# Patient Record
Sex: MALE | Race: WHITE | NOT HISPANIC OR LATINO | Employment: OTHER | URBAN - NONMETROPOLITAN AREA
[De-identification: names, ages, dates, MRNs, and addresses within clinical notes are randomized per-mention and may not be internally consistent; named-entity substitution may affect disease eponyms.]

---

## 2017-04-11 ENCOUNTER — COMMUNICATION - GICH (OUTPATIENT)
Dept: FAMILY MEDICINE | Facility: OTHER | Age: 37
End: 2017-04-11

## 2017-04-11 ENCOUNTER — HOSPITAL ENCOUNTER (OUTPATIENT)
Dept: RADIOLOGY | Facility: OTHER | Age: 37
End: 2017-04-11
Attending: NURSE PRACTITIONER

## 2017-04-11 ENCOUNTER — HISTORY (OUTPATIENT)
Dept: FAMILY MEDICINE | Facility: OTHER | Age: 37
End: 2017-04-11

## 2017-04-11 ENCOUNTER — OFFICE VISIT - GICH (OUTPATIENT)
Dept: FAMILY MEDICINE | Facility: OTHER | Age: 37
End: 2017-04-11

## 2017-04-11 DIAGNOSIS — J22 ACUTE LOWER RESPIRATORY INFECTION: ICD-10-CM

## 2017-04-11 DIAGNOSIS — J06.9 ACUTE UPPER RESPIRATORY INFECTION: ICD-10-CM

## 2018-01-04 NOTE — TELEPHONE ENCOUNTER
"Patient Information     Patient Name MRN Greg Burris [id number] Male 1980      Telephone Encounter by Marie Casas RN at 2017 10:15 AM     Author:  Marie Casas RN Service:  (none) Author Type:  NURS- Registered Nurse     Filed:  2017 10:33 AM Encounter Date:  2017 Status:  Signed     :  Marie Casas RN (NURS- Registered Nurse)            Patient reports \"chest cold\" and shooting pain lasting 4-5 seconds in right chest. Per triage protocol, advised patient to be seen within 3 days. Transferred to scheduling.    Reason for Disposition    [1] Chest pain(s) lasting a few seconds AND [2] persists > 3 days    Answer Assessment - Initial Assessment Questions  1. LOCATION: \"Where does it hurt?\"        Right side, near pectoral muscle  2. RADIATION: \"Does the pain go anywhere else?\" (e.g., into neck, jaw, arms, back)      no  3. ONSET: \"When did the chest pain begin?\" (Minutes, hours or days)       2-3 days ago  4. PATTERN \"Does the pain come and go, or has it been constant since it started?\"  \"Does it get worse with exertion?\"       Comes and goes, not worse with exertion  5. DURATION: \"How long does it last\" (e.g., seconds, minutes, hours)      4-5 seconds  6. SEVERITY: \"How bad is the pain?\"  (e.g., Scale 1-10; mild, moderate, or severe)     - MILD (1-3): doesn't interfere with normal activities      - MODERATE (4-7): interferes with normal activities or awakens from sleep     - SEVERE (8-10): excruciating pain, unable to do any normal activities        4-5/10  7. CARDIAC RISK FACTORS: \"Do you have any history of heart problems or risk factors for heart disease?\" (e.g., prior heart attack, angina; high blood pressure, diabetes, being overweight, high cholesterol, smoking, or strong family history of heart disease)      overweight  8. PULMONARY RISK FACTORS: \"Do you have any history of lung disease?\"  (e.g., blood clots in lung, asthma, emphysema, birth " "control pills)      Lung collapsed at birth, has had cough for several weeks  9. CAUSE: \"What do you think is causing the chest pain?\"      unknown  10. OTHER SYMPTOMS: \"Do you have any other symptoms?\" (e.g., dizziness, nausea, vomiting, sweating, fever, difficulty breathing, cough)      cough  11. PREGNANCY: \"Is there any chance you are pregnant?\" \"When was your last menstrual period?\"      N/a male    Protocols used: ADULT CHEST PAIN-A-AH            "

## 2018-01-04 NOTE — PROGRESS NOTES
Patient Information     Patient Name MRN Sex Greg Dan [id number] Male 1980      Progress Notes by Lizzie Escudero NP at 2017 12:00 PM     Author:  Lizzie Escudero NP Service:  (none) Author Type:  PHYS- Nurse Practitioner     Filed:  2017  2:23 PM Encounter Date:  2017 Status:  Signed     :  Lizzie Escudero NP (PHYS- Nurse Practitioner)            Nursing Notes:   Lalita Estrella  2017 12:28 PM  Signed  Patient presents to clinic with chest congestion, non productive cough x3-4 days  Lalita Estrella LPN....................  2017   12:12 PM    SUBJECTIVE:    Greg Mckenna is a 37 y.o. male who presents for URI    Cough   This is a new problem. Episode onset: 3-4 days. The problem has been rapidly worsening. The problem occurs constantly. The cough is productive of sputum. Pertinent negatives include no chest pain, chills, ear congestion, ear pain, fever, headaches, myalgias, nasal congestion, rash, rhinorrhea, sore throat, shortness of breath, sweats, weight loss or wheezing. Associated symptoms comments: Cough that is dry and at times productive. Chest hurts from coughing. . The symptoms are aggravated by lying down. He has tried nothing for the symptoms. The treatment provided no relief. There is no history of asthma, bronchiectasis, COPD, environmental allergies or pneumonia.       No current outpatient prescriptions on file prior to visit.     No current facility-administered medications on file prior to visit.        REVIEW OF SYSTEMS:  Review of Systems   Constitutional: Negative for chills, fever and weight loss.   HENT: Negative for ear pain, rhinorrhea and sore throat.    Respiratory: Positive for cough. Negative for shortness of breath and wheezing.    Cardiovascular: Negative for chest pain.   Musculoskeletal: Negative for myalgias.   Skin: Negative for rash.   Neurological: Negative for headaches.   Endo/Heme/Allergies: Negative for  environmental allergies.       OBJECTIVE:  /68  Pulse 68  Temp 98.1  F (36.7  C) (Tympanic)  Resp 16  Wt 105.3 kg (232 lb 4 oz)  BMI 34.05 kg/m2    EXAM:   Physical Exam   Constitutional: He is well-developed, well-nourished, and in no distress.   HENT:   Head: Normocephalic and atraumatic.   Right Ear: Tympanic membrane and ear canal normal.   Left Ear: Tympanic membrane and ear canal normal.   Nose: Rhinorrhea present.   Mouth/Throat: Uvula is midline, oropharynx is clear and moist and mucous membranes are normal.   Eyes: Conjunctivae are normal.   Neck: Neck supple.   Cardiovascular: Normal rate, regular rhythm and normal heart sounds.    Pulmonary/Chest: Effort normal and breath sounds normal. No respiratory distress. He has no decreased breath sounds. He has no wheezes. He has no rhonchi. He has no rales.   Dry cough is heard   Lymphadenopathy:     He has no cervical adenopathy.   Nursing note and vitals reviewed.      Completed Chest xray.  I personally reviewed the xray. There was a RT lower lobe infiltrate noted upon initial read of xray.  Final read pending by radiology.    ASSESSMENT/PLAN:    ICD-10-CM    1. URI with cough and congestion J06.9 XR CHEST 2 VIEWS PA AND LATERAL   2. Acute lower respiratory tract infection J22 azithromycin (ZITHROMAX) 250 mg tablet      codeine-guaiFENesin (ROBITUSSIN AC)  mg/5 mL liquid        Plan:  ABX and Home cares with OTC discussed. Cough syrup discussed. F/U if getting worse. I explained my diagnostic considerations and recommendations to the patient, who voiced understanding and agreement with the treatment plan. All questions were answered. We discussed potential side effects of any prescribed or recommended therapies, as well as expectations for response to treatments. He was advised to contact our office if there is no improvement or worsening of conditions or symptoms.  If s/s worsen or persist, patient will either come back or follow up with PCP.          YASH CHAPIN NP ....................  4/11/2017   2:22 PM

## 2018-01-04 NOTE — PATIENT INSTRUCTIONS
Patient Information     Patient Name MRN Greg Burris [id number] Male 1980      Patient Instructions by Lizzie Escudero NP at 2017 12:00 PM     Author:  Lizzie Escudero NP  Service:  (none) Author Type:  PHYS- Nurse Practitioner     Filed:  2017 12:42 PM  Encounter Date:  2017 Status:  Addendum     :  Lizzie Escudero NP (PHYS- Nurse Practitioner)        Related Notes: Original Note by Lizzie Escudero NP (PHYS- Nurse Practitioner) filed at 2017 12:42 PM            Acute Bronchitis   ________________________________________________________________________  KEY POINTS    Acute bronchitis is swelling and irritation of the airways (bronchial tubes) which connect the windpipe to the lungs. Acute bronchitis may also be called a chest cold.    Acute bronchitis often does not need medical treatment. You may be able to treat your symptoms at home. Talk to your healthcare provider if your symptoms are severe or if you have another medical condition such as heart or lung disease or diabetes.    Drink plenty of liquids and rest at home. Ask your healthcare provider what symptoms or problems you should watch for and what to do if you have them.  ________________________________________________________________________  What is acute bronchitis?  Acute bronchitis is swelling and irritation of the airways (bronchial tubes) which connect the windpipe to the lungs. Acute bronchitis usually goes away within a few weeks with treatment. Acute bronchitis may also be called a chest cold.  A different form of bronchitis, called chronic bronchitis, is a long-term condition that causes breathing problems. Chronic bronchitis is one type of chronic obstructive pulmonary disease (COPD) and is usually caused by smoking.  What is the cause?  Acute bronchitis is usually caused by viral infections that affect the lungs. Less often, it may be a bacterial infection.  Acute bronchitis is most  common during the winter or when the level of air pollution is high.  People who have a higher risk for bronchitis include:    Infants, young children, and older adults    Smokers    People with heart disease    People with allergies, asthma, or other lung diseases  What are the symptoms?  Symptoms may include:    A deep cough with yellowish or greenish mucus    Pain in your chest when you breathe deeply or cough    Wheezing or feeling short of breath    Fever and chills    Headache    Body aches  How is it diagnosed?  Your healthcare provider will ask about your symptoms and medical history and examine you. You may have tests, such as:    Chest X-ray    Blood tests  How is it treated?  Acute bronchitis often does not need medical treatment. You may be able to treat your symptoms at home:    Get plenty of rest.    Drink plenty of clear liquids unless your healthcare provider has told you to limit liquids. Water, broth, juice, electrolyte solutions, and non-caffeinated drinks are best. If you have a fever, your body needs more liquid because you can get dehydrated.    Talk to your healthcare provider if your symptoms are severe or if you have heart disease, asthma, chronic bronchitis, kidney disease, diabetes, or another chronic medical problem. You may need to take antibiotic medicines. If you are wheezing, you may need an inhaler medicine to make it easier to breathe.  Most of the time acute bronchitis clears up in several days. Your cough may slowly get better in 1 to 4 weeks. It may take you longer to recover if:    You are a smoker.    You live in an area where air pollution is a problem.    You have a heart or lung disease, including asthma.    You have other health problems.  How can I take care of myself?  Follow the full course of treatment prescribed by your healthcare provider. In addition:    Use a humidifier to put more moisture in the air. Avoid steam vaporizers because they can cause burns. Be sure to  keep the humidifier clean, as recommended in the 's instructions. It's important to keep bacteria and mold from growing in the water container.    Drink plenty of liquids. Ask your healthcare provider about how much liquid you should drink each day.    Take cough medicine if recommended by your healthcare provider. Cover your cough.    Don t smoke, and stay away from others who are smoking.    Avoid breathing dust and chemical fumes.    Get extra rest.    Take nonprescription medicine, such as acetaminophen, ibuprofen, or naproxen to treat pain and fever. Read the label and take as directed. Unless recommended by your healthcare provider, you should not take these medicines for more than 10 days.    Nonsteroidal anti-inflammatory medicines (NSAIDs), such as ibuprofen, naproxen, and aspirin, may cause stomach bleeding and other problems. These risks increase with age.    Acetaminophen may cause liver damage or other problems. Unless recommended by your provider, don't take more than 3000 milligrams (mg) in 24 hours. To make sure you don t take too much, check other medicines you take to see if they also contain acetaminophen. Ask your provider if you need to avoid drinking alcohol while taking this medicine.  Ask your provider:    How and when you will get your test results    How long it will take to recover    If there are activities you should avoid and when you can return to your normal activities    How to take care of yourself at home    What symptoms or problems you should watch for and what to do if you have them  Make sure you know when you should come back for a checkup. Keep all appointments for provider visits or tests.  How can I help prevent acute bronchitis?  To reduce your risk of getting a lung infection:    Wash your hands often and especially after using the restroom, coughing, sneezing, or blowing your nose. Also wash your hands before eating or touching your eyes.    Stay at least 6  feet away from people who are sick, if you can.    Stay indoors as much as possible on high-pollution days.    Take care of your health. Try to get at least 7 to 9 hours of sleep each night. Eat a healthy diet and try to keep a healthy weight. If you smoke, try to quit. If you want to drink alcohol, ask your healthcare provider how much is safe for you to drink. Learn ways to manage stress. Exercise according to your healthcare provider's instructions.         We will call you if the radiologist sees anything different.

## 2018-01-04 NOTE — NURSING NOTE
Patient Information     Patient Name MRN Greg Burris [id number] Male 1980      Nursing Note by Lalita Estrella at 2017 12:00 PM     Author:  Lalita Estrella Service:  (none) Author Type:  (none)     Filed:  2017 12:28 PM Encounter Date:  2017 Status:  Signed     :  Lalita Estrella            Patient presents to clinic with chest congestion, non productive cough x3-4 days  Lalita Estrella LPN....................  2017   12:12 PM

## 2018-01-25 VITALS
RESPIRATION RATE: 16 BRPM | HEART RATE: 68 BPM | SYSTOLIC BLOOD PRESSURE: 140 MMHG | BODY MASS INDEX: 33.56 KG/M2 | TEMPERATURE: 98.1 F | WEIGHT: 232.25 LBS | DIASTOLIC BLOOD PRESSURE: 68 MMHG

## 2018-04-17 ENCOUNTER — HOSPITAL ENCOUNTER (OUTPATIENT)
Dept: GENERAL RADIOLOGY | Facility: OTHER | Age: 38
Discharge: HOME | End: 2018-04-17
Attending: FAMILY MEDICINE | Admitting: FAMILY MEDICINE
Payer: COMMERCIAL

## 2018-04-17 ENCOUNTER — OFFICE VISIT (OUTPATIENT)
Dept: FAMILY MEDICINE | Facility: OTHER | Age: 38
End: 2018-04-17
Attending: FAMILY MEDICINE
Payer: COMMERCIAL

## 2018-04-17 VITALS
BODY MASS INDEX: 34.36 KG/M2 | HEIGHT: 70 IN | SYSTOLIC BLOOD PRESSURE: 144 MMHG | TEMPERATURE: 97.6 F | WEIGHT: 240 LBS | DIASTOLIC BLOOD PRESSURE: 90 MMHG | HEART RATE: 70 BPM

## 2018-04-17 DIAGNOSIS — R06.2 WHEEZING: ICD-10-CM

## 2018-04-17 DIAGNOSIS — R10.84 ABDOMINAL PAIN, GENERALIZED: ICD-10-CM

## 2018-04-17 DIAGNOSIS — R10.84 ABDOMINAL PAIN, GENERALIZED: Primary | ICD-10-CM

## 2018-04-17 PROBLEM — K27.9 PEPTIC ULCER DISEASE: Status: ACTIVE | Noted: 2018-04-17

## 2018-04-17 LAB
ALBUMIN SERPL-MCNC: 4.3 G/DL (ref 3.5–5.7)
ALBUMIN UR-MCNC: ABNORMAL MG/DL
ALP SERPL-CCNC: 76 U/L (ref 34–104)
ALT SERPL W P-5'-P-CCNC: 36 U/L (ref 7–52)
ANION GAP SERPL CALCULATED.3IONS-SCNC: 9 MMOL/L (ref 3–14)
APPEARANCE UR: CLEAR
AST SERPL W P-5'-P-CCNC: 19 U/L (ref 13–39)
BILIRUB SERPL-MCNC: 0.5 MG/DL (ref 0.3–1)
BILIRUB UR QL STRIP: NEGATIVE
BUN SERPL-MCNC: 14 MG/DL (ref 7–25)
CALCIUM SERPL-MCNC: 9.7 MG/DL (ref 8.6–10.3)
CHLORIDE SERPL-SCNC: 102 MMOL/L (ref 98–107)
CO2 SERPL-SCNC: 28 MMOL/L (ref 21–31)
COLOR UR AUTO: YELLOW
CREAT SERPL-MCNC: 0.99 MG/DL (ref 0.7–1.3)
ERYTHROCYTE [DISTWIDTH] IN BLOOD BY AUTOMATED COUNT: 12.7 % (ref 10–15)
GFR SERPL CREATININE-BSD FRML MDRD: 84 ML/MIN/1.7M2
GLUCOSE SERPL-MCNC: 105 MG/DL (ref 70–105)
GLUCOSE UR STRIP-MCNC: NEGATIVE MG/DL
HCT VFR BLD AUTO: 46.4 % (ref 40–53)
HGB BLD-MCNC: 15.7 G/DL (ref 13.3–17.7)
HGB UR QL STRIP: NEGATIVE
KETONES UR STRIP-MCNC: NEGATIVE MG/DL
LEUKOCYTE ESTERASE UR QL STRIP: NEGATIVE
MCH RBC QN AUTO: 30.6 PG (ref 26.5–33)
MCHC RBC AUTO-ENTMCNC: 33.8 G/DL (ref 31.5–36.5)
MCV RBC AUTO: 90 FL (ref 78–100)
NITRATE UR QL: NEGATIVE
PH UR STRIP: 5.5 PH (ref 5–7)
PLATELET # BLD AUTO: 174 10E9/L (ref 150–450)
POTASSIUM SERPL-SCNC: 4.2 MMOL/L (ref 3.5–5.1)
PROT SERPL-MCNC: 7.5 G/DL (ref 6.4–8.9)
RBC # BLD AUTO: 5.13 10E12/L (ref 4.4–5.9)
RBC #/AREA URNS AUTO: NORMAL /HPF
SODIUM SERPL-SCNC: 139 MMOL/L (ref 134–144)
SOURCE: ABNORMAL
SP GR UR STRIP: >1.03 (ref 1–1.03)
UROBILINOGEN UR STRIP-ACNC: 0.2 EU/DL (ref 0.2–1)
WBC # BLD AUTO: 7.5 10E9/L (ref 4–11)
WBC #/AREA URNS AUTO: NORMAL /HPF

## 2018-04-17 PROCEDURE — 80053 COMPREHEN METABOLIC PANEL: CPT | Performed by: FAMILY MEDICINE

## 2018-04-17 PROCEDURE — 85027 COMPLETE CBC AUTOMATED: CPT | Performed by: FAMILY MEDICINE

## 2018-04-17 PROCEDURE — 99214 OFFICE O/P EST MOD 30 MIN: CPT | Performed by: FAMILY MEDICINE

## 2018-04-17 PROCEDURE — 36415 COLL VENOUS BLD VENIPUNCTURE: CPT | Performed by: FAMILY MEDICINE

## 2018-04-17 PROCEDURE — 81001 URINALYSIS AUTO W/SCOPE: CPT | Performed by: FAMILY MEDICINE

## 2018-04-17 PROCEDURE — 71046 X-RAY EXAM CHEST 2 VIEWS: CPT

## 2018-04-17 RX ORDER — ALBUTEROL SULFATE 90 UG/1
2 AEROSOL, METERED RESPIRATORY (INHALATION) EVERY 4 HOURS PRN
Qty: 1 INHALER | Refills: 1 | Status: SHIPPED | OUTPATIENT
Start: 2018-04-17 | End: 2020-04-07

## 2018-04-17 ASSESSMENT — PAIN SCALES - GENERAL: PAINLEVEL: MODERATE PAIN (4)

## 2018-04-17 NOTE — NURSING NOTE
Patient presents in the clinic with stomach pains for the last week.  Glenys Ag LPN 4/17/2018 1:53 PM

## 2018-04-17 NOTE — MR AVS SNAPSHOT
"              After Visit Summary   4/17/2018    Greg Mckenna    MRN: [id number]           Patient Information     Date Of Birth          1980        Visit Information        Provider Department      4/17/2018 1:45 PM Chato Burr MD [facility]        Today's Diagnoses     Abdominal pain, generalized    -  1    Wheezing           Follow-ups after your visit        Future tests that were ordered for you today     Open Future Orders        Priority Expected Expires Ordered    CT Abdomen Pelvis w Contrast Routine  4/17/2019 4/17/2018    XR Chest 2 Views Routine 4/17/2018 4/17/2019 4/17/2018            Who to contact     If you have questions or need follow up information about today's clinic visit or your schedule please contact [facility] directly at [phone].  Normal or non-critical lab and imaging results will be communicated to you by MyChart, letter or phone within 4 business days after the clinic has received the results. If you do not hear from us within 7 days, please contact the clinic through MyChart or phone. If you have a critical or abnormal lab result, we will notify you by phone as soon as possible.  Submit refill requests through [employer] or call your pharmacy and they will forward the refill request to us. Please allow 3 business days for your refill to be completed.          Additional Information About Your Visit        MyChart Information     [employer] lets you send messages to your doctor, view your test results, renew your prescriptions, schedule appointments and more. To sign up, go to [email or web address] . Click on \"Log in\" on the left side of the screen, which will take you to the Welcome page. Then click on \"Sign up Now\" on the right side of the page.     You will be asked to enter the access code listed below, as well as some personal information. Please follow the directions to create your username and password.     Your access " "code is: [email or web address]  Expires: 2018  3:13 PM     Your access code will  in 90 days. If you need help or a new code, please call your [facility] clinic or [phone].        Care EveryWhere ID     This is your Care EveryWhere ID. This could be used by other organizations to access your [facility] medical records  [id number]        Your Vitals Were     Pulse Temperature Height BMI (Body Mass Index)          70 97.6  F (36.4  C) (Temporal) 5' 9.5\" (1.765 m) 34.93 kg/m2         Blood Pressure from Last 3 Encounters:   18 144/90   17 140/68   16 (!) 138/100    Weight from Last 3 Encounters:   18 240 lb (108.9 kg)   17 232 lb 4 oz (105.3 kg)   16 230 lb (104.3 kg)              We Performed the Following     *UA reflex to Microscopic     CBC with platelets     Comprehensive metabolic panel     Urine Microscopic          Today's Medication Changes          These changes are accurate as of 18  3:13 PM.  If you have any questions, ask your nurse or doctor.               Start taking these medicines.        Dose/Directions    albuterol 108 (90 Base) MCG/ACT Inhaler   Commonly known as:  PROAIR HFA/PROVENTIL HFA/VENTOLIN HFA   Used for:  Wheezing   Started by:  Chato Burr MD        Dose:  2 puff   Inhale 2 puffs into the lungs every 4 hours as needed for shortness of breath / dyspnea or wheezing   Quantity:  1 Inhaler   Refills:  1            Where to get your medicines      These medications were sent to [facility] - [place], [place] - [address]  [address], [facility] [address]     Phone:  [phone]     albuterol 108 (90 Base) MCG/ACT Inhaler                Primary Care Provider    None Specified       No primary provider on file.        Equal Access to Services     VISHAL COLÓN : Kain Knowles, rl younger, amie camacho, rishi granda. [facility] [phone].    ATENCIÓN: Si habla " español, tiene a [place] disposición servicios gratuitos de asistencia lingüística. Renny ward [phone].    We comply with applicable federal civil rights laws and [place] laws. We do not discriminate on the basis of race, color, national origin, age, disability, sex, sexual orientation, or gender identity.            Thank you!     Thank you for choosing [facility] AND [facility]  for your care. Our goal is always to provide you with excellent care. Hearing back from our patients is one way we can continue to improve our services. Please take a few minutes to complete the written survey that you may receive in the mail after your visit with us. Thank you!             Your Updated Medication List - Protect others around you: Learn how to safely use, store and throw away your medicines at www.disposemymeds.org.          This list is accurate as of 4/17/18  3:13 PM.  Always use your most recent med list.                   Brand Name Dispense Instructions for use Diagnosis    albuterol 108 (90 Base) MCG/ACT Inhaler    PROAIR HFA/PROVENTIL HFA/VENTOLIN HFA    1 Inhaler    Inhale 2 puffs into the lungs every 4 hours as needed for shortness of breath / dyspnea or wheezing    Wheezing

## 2018-04-18 ENCOUNTER — HOSPITAL ENCOUNTER (OUTPATIENT)
Dept: CT IMAGING | Facility: OTHER | Age: 38
Discharge: HOME | End: 2018-04-18
Attending: FAMILY MEDICINE | Admitting: FAMILY MEDICINE
Payer: COMMERCIAL

## 2018-04-18 DIAGNOSIS — R10.84 ABDOMINAL PAIN, GENERALIZED: ICD-10-CM

## 2018-04-18 PROCEDURE — 25000125 ZZHC RX 250: Performed by: FAMILY MEDICINE

## 2018-04-18 PROCEDURE — 74177 CT ABD & PELVIS W/CONTRAST: CPT

## 2018-04-18 RX ADMIN — IOHEXOL 100 ML: 350 INJECTION, SOLUTION INTRAVENOUS at 14:37

## 2018-04-18 ASSESSMENT — PATIENT HEALTH QUESTIONNAIRE - PHQ9: SUM OF ALL RESPONSES TO PHQ QUESTIONS 1-9: 0

## 2018-04-19 ENCOUNTER — TELEPHONE (OUTPATIENT)
Dept: FAMILY MEDICINE | Facility: OTHER | Age: 38
End: 2018-04-19

## 2018-04-19 DIAGNOSIS — R10.84 ABDOMINAL PAIN, GENERALIZED: Primary | ICD-10-CM

## 2018-05-01 NOTE — TELEPHONE ENCOUNTER
See letter from 4/19/18, where Chato Burr MD notes that he spoke with patient. Will close note.  Glenys Ag LPN 5/1/2018 11:42 AM

## 2019-09-23 ENCOUNTER — HOSPITAL ENCOUNTER (OUTPATIENT)
Dept: GENERAL RADIOLOGY | Facility: OTHER | Age: 39
Discharge: HOME | End: 2019-09-23
Attending: NURSE PRACTITIONER | Admitting: NURSE PRACTITIONER
Payer: COMMERCIAL

## 2019-09-23 ENCOUNTER — OFFICE VISIT (OUTPATIENT)
Dept: FAMILY MEDICINE | Facility: OTHER | Age: 39
End: 2019-09-23
Attending: PHYSICIAN ASSISTANT
Payer: COMMERCIAL

## 2019-09-23 VITALS
TEMPERATURE: 98.1 F | RESPIRATION RATE: 16 BRPM | DIASTOLIC BLOOD PRESSURE: 90 MMHG | OXYGEN SATURATION: 97 % | SYSTOLIC BLOOD PRESSURE: 139 MMHG | HEART RATE: 87 BPM | WEIGHT: 244 LBS | HEIGHT: 70 IN | BODY MASS INDEX: 34.93 KG/M2

## 2019-09-23 DIAGNOSIS — J40 BRONCHITIS: Primary | ICD-10-CM

## 2019-09-23 DIAGNOSIS — R06.02 SOB (SHORTNESS OF BREATH): ICD-10-CM

## 2019-09-23 LAB
BASOPHILS # BLD AUTO: 0 10E9/L (ref 0–0.2)
BASOPHILS NFR BLD AUTO: 0.4 %
DIFFERENTIAL METHOD BLD: NORMAL
EOSINOPHIL # BLD AUTO: 0.3 10E9/L (ref 0–0.7)
EOSINOPHIL NFR BLD AUTO: 3.2 %
ERYTHROCYTE [DISTWIDTH] IN BLOOD BY AUTOMATED COUNT: 12.5 % (ref 10–15)
HCT VFR BLD AUTO: 46 % (ref 40–53)
HGB BLD-MCNC: 15.2 G/DL (ref 13.3–17.7)
IMM GRANULOCYTES # BLD: 0 10E9/L (ref 0–0.4)
IMM GRANULOCYTES NFR BLD: 0.2 %
LYMPHOCYTES # BLD AUTO: 1.8 10E9/L (ref 0.8–5.3)
LYMPHOCYTES NFR BLD AUTO: 22.1 %
MCH RBC QN AUTO: 31.3 PG (ref 26.5–33)
MCHC RBC AUTO-ENTMCNC: 33 G/DL (ref 31.5–36.5)
MCV RBC AUTO: 95 FL (ref 78–100)
MONOCYTES # BLD AUTO: 0.6 10E9/L (ref 0–1.3)
MONOCYTES NFR BLD AUTO: 7.5 %
NEUTROPHILS # BLD AUTO: 5.4 10E9/L (ref 1.6–8.3)
NEUTROPHILS NFR BLD AUTO: 66.6 %
PLATELET # BLD AUTO: 183 10E9/L (ref 150–450)
RBC # BLD AUTO: 4.86 10E12/L (ref 4.4–5.9)
WBC # BLD AUTO: 8.1 10E9/L (ref 4–11)

## 2019-09-23 PROCEDURE — 99214 OFFICE O/P EST MOD 30 MIN: CPT | Performed by: NURSE PRACTITIONER

## 2019-09-23 PROCEDURE — 85025 COMPLETE CBC W/AUTO DIFF WBC: CPT | Mod: ZL | Performed by: NURSE PRACTITIONER

## 2019-09-23 PROCEDURE — 36415 COLL VENOUS BLD VENIPUNCTURE: CPT | Mod: ZL | Performed by: NURSE PRACTITIONER

## 2019-09-23 PROCEDURE — 71046 X-RAY EXAM CHEST 2 VIEWS: CPT

## 2019-09-23 RX ORDER — AZITHROMYCIN 250 MG/1
TABLET, FILM COATED ORAL
Qty: 6 TABLET | Refills: 0 | Status: SHIPPED | OUTPATIENT
Start: 2019-09-23 | End: 2019-09-28

## 2019-09-23 RX ORDER — PREDNISONE 20 MG/1
TABLET ORAL
Qty: 10 TABLET | Refills: 0 | Status: SHIPPED | OUTPATIENT
Start: 2019-09-23 | End: 2020-04-07

## 2019-09-23 ASSESSMENT — ENCOUNTER SYMPTOMS
COUGH: 1
SORE THROAT: 0
SHORTNESS OF BREATH: 1
ABDOMINAL PAIN: 0
CHEST TIGHTNESS: 0
RHINORRHEA: 0
FATIGUE: 1
HEADACHES: 0
MUSCULOSKELETAL NEGATIVE: 1

## 2019-09-23 ASSESSMENT — MIFFLIN-ST. JEOR: SCORE: 2018.03

## 2019-09-23 ASSESSMENT — PAIN SCALES - GENERAL: PAINLEVEL: MODERATE PAIN (5)

## 2019-09-23 NOTE — NURSING NOTE
"Patient presents to clinic for possible pneumonia x 2 days. States it is worsening. Has tried mucinex, cough drops, giovani seltzer cough and cold.   Chief Complaint   Patient presents with     Cough       Initial BP (!) 139/90   Pulse 87   Temp 98.1  F (36.7  C) (Tympanic)   Resp 16   Ht 1.778 m (5' 10\")   Wt 110.7 kg (244 lb)   SpO2 97%   BMI 35.01 kg/m   Estimated body mass index is 35.01 kg/m  as calculated from the following:    Height as of this encounter: 1.778 m (5' 10\").    Weight as of this encounter: 110.7 kg (244 lb).  Medication Reconciliation: complete    Michelle Corona LPN    "

## 2019-09-23 NOTE — PROGRESS NOTES
SUBJECTIVE:   Greg Mckenna is a 39 year old male who presents to clinic today for the following health issues:    HPI  Presents with a cough and congestion x 2 days. No history of asthma. Hasn't checked for a fever. No sore throat. Eating and drinking well. No sinus congestion. Feels SOB. Taking mucinex and cough medication without benefit. No PCP.  Reports he has no chronic illnesses, does not take daily medications.    Patient Active Problem List    Diagnosis Date Noted     Bursitis 04/17/2018     Priority: Medium     Overview:   tenosynovistis right shoulder       Peptic ulcer disease 04/17/2018     Priority: Medium     Overweight 05/22/2012     Priority: Medium     Malaise and fatigue 05/22/2012     Priority: Medium     Elevated blood pressure reading without diagnosis of hypertension 05/22/2012     Priority: Medium     Major depressive disorder, single episode, moderate (H) 08/16/2011     Priority: Medium     Past Medical History:   Diagnosis Date     Chronic sinusitis     No Comments Provided     Epididymitis     No Comments Provided     Personal history of other diseases of the female genital tract     Premature, born @ 23 weeks      Past Surgical History:   Procedure Laterality Date     ADENOIDECTOMY      x2     OTHER SURGICAL HISTORY      535308,OTHER,x2     OTHER SURGICAL HISTORY      [id number],OTHER     TONSILLECTOMY      No Comments Provided     Family History   Problem Relation Age of Onset     Hypertension Father         Hypertension     Other - See Comments Father         hypercholesterolemia/back problems     Other - See Comments Mother         MS     Diabetes Other         Diabetes,FH     Social History     Tobacco Use     Smoking status: Never Smoker     Smokeless tobacco: Never Used   Substance Use Topics     Alcohol use: Yes     Social History     Patient does not qualify to have social determinant information on file (likely too young).   Social History Narrative    ,  with two children.  "  Works as a .     Current Outpatient Medications   Medication Sig Dispense Refill     azithromycin (ZITHROMAX) 250 MG tablet Take 2 tablets (500 mg) by mouth daily for 1 day, THEN 1 tablet (250 mg) daily for 4 days. 6 tablet 0     predniSONE (DELTASONE) 20 MG tablet Take two tablets (= 40mg) each day for 5 (five) days 10 tablet 0     albuterol (PROAIR HFA/PROVENTIL HFA/VENTOLIN HFA) 108 (90 Base) MCG/ACT Inhaler Inhale 2 puffs into the lungs every 4 hours as needed for shortness of breath / dyspnea or wheezing (Patient not taking: Reported on 9/23/2019) 1 Inhaler 1     No Known Allergies    Review of Systems   Constitutional: Positive for fatigue.   HENT: Positive for congestion. Negative for ear pain, rhinorrhea and sore throat.    Respiratory: Positive for cough and shortness of breath. Negative for chest tightness.    Cardiovascular: Negative for chest pain.   Gastrointestinal: Negative for abdominal pain.   Musculoskeletal: Negative.    Skin: Negative.    Allergic/Immunologic: Negative for immunocompromised state.   Neurological: Negative for headaches.        OBJECTIVE:     BP (!) 139/90   Pulse 87   Temp 98.1  F (36.7  C) (Tympanic)   Resp 16   Ht 1.778 m (5' 10\")   Wt 110.7 kg (244 lb)   SpO2 97%   BMI 35.01 kg/m    Body mass index is 35.01 kg/m .  Physical Exam  Vitals signs and nursing note reviewed. Exam conducted with a chaperone present.   Constitutional:       General: He is not in acute distress.     Appearance: He is well-developed and well-groomed. He is obese. He is not ill-appearing, toxic-appearing or diaphoretic.   HENT:      Head: Normocephalic and atraumatic.      Right Ear: Tympanic membrane, ear canal and external ear normal.      Left Ear: Tympanic membrane, ear canal and external ear normal.      Nose: Nose normal.   Eyes:      General: No scleral icterus.     Conjunctiva/sclera: Conjunctivae normal.   Neck:      Musculoskeletal: Normal range of motion and neck supple. "   Cardiovascular:      Rate and Rhythm: Normal rate and regular rhythm.      Pulses: Normal pulses.      Heart sounds: Normal heart sounds. No gallop.    Pulmonary:      Effort: Pulmonary effort is normal.      Breath sounds: Wheezing present.   Musculoskeletal: Normal range of motion.   Skin:     General: Skin is warm and dry.   Neurological:      General: No focal deficit present.      Mental Status: He is alert and oriented to person, place, and time.      Gait: Gait normal.   Psychiatric:         Attention and Perception: Attention normal.         Mood and Affect: Mood and affect normal.         Speech: Speech normal.         Behavior: Behavior normal. Behavior is cooperative.         Judgement: Judgment normal.         Diagnostic Test Results:  Results for orders placed or performed in visit on 09/23/19 (from the past 24 hour(s))   CBC with platelets differential   Result Value Ref Range    WBC 8.1 4.0 - 11.0 10e9/L    RBC Count 4.86 4.4 - 5.9 10e12/L    Hemoglobin 15.2 13.3 - 17.7 g/dL    Hematocrit 46.0 40.0 - 53.0 %    MCV 95 78 - 100 fl    MCH 31.3 26.5 - 33.0 pg    MCHC 33.0 31.5 - 36.5 g/dL    RDW 12.5 10.0 - 15.0 %    Platelet Count 183 150 - 450 10e9/L    Diff Method Automated Method     % Neutrophils 66.6 %    % Lymphocytes 22.1 %    % Monocytes 7.5 %    % Eosinophils 3.2 %    % Basophils 0.4 %    % Immature Granulocytes 0.2 %    Absolute Neutrophil 5.4 1.6 - 8.3 10e9/L    Absolute Lymphocytes 1.8 0.8 - 5.3 10e9/L    Absolute Monocytes 0.6 0.0 - 1.3 10e9/L    Absolute Eosinophils 0.3 0.0 - 0.7 10e9/L    Absolute Basophils 0.0 0.0 - 0.2 10e9/L    Abs Immature Granulocytes 0.0 0 - 0.4 10e9/L   XR Chest 2 Views    Narrative    PROCEDURE:  XR CHEST 2 VW    HISTORY: SOB; SOB (shortness of breath), .    COMPARISON:  4/17/2018.    FINDINGS:  The cardiomediastinal contours are stable.  The trachea is midline.  No focal consolidation, effusion or pneumothorax.    No suspicious osseous lesion or subdiaphragmatic  free air.      Impression    IMPRESSION:      No acute cardiopulmonary process.      FRANK CAVANAUGH MD       ASSESSMENT/PLAN:       ICD-10-CM    1. Bronchitis J40 azithromycin (ZITHROMAX) 250 MG tablet     predniSONE (DELTASONE) 20 MG tablet   2. SOB (shortness of breath) R06.02 XR Chest 2 Views     CBC with platelets differential       PLAN:  Patient is afebrile, O2 sat is 97%, NAD, nontoxic-appearing, he has wheezing with a productive cough on exam.  Chest x-ray is clear, WBC is 8.1.  Due to acute progressive symptoms and wheezing, will treat with azithromycin and prednisone.  Advised patient to monitor closely, rest and push fluids.  Follow-up with the clinic in 3 to 5 days if not improving, sooner if symptoms worsen.  Also advised to follow-up in the clinic to establish care.  Given epic educational materials.  I explained my diagnostic considerations and recommendations to patient who voiced understanding and agreement with the treatment plan. All questions were answered. We discussed potential side effects of any prescribed or recommended therapies, as well as expectations for response to treatments.    Disclaimer:  This note consists of words and symbols derived from keyboarding, dictation, or using voice recognition software. As a result, there may be errors in the script that have gone undetected. Please consider this when interpreting information found in this note.      OLGA Montoya, NP-C  9/23/2019 at 11:27 AM  [facility]

## 2019-09-23 NOTE — PATIENT INSTRUCTIONS

## 2020-04-07 ENCOUNTER — HOSPITAL ENCOUNTER (OUTPATIENT)
Dept: GENERAL RADIOLOGY | Facility: OTHER | Age: 40
End: 2020-04-07
Attending: FAMILY MEDICINE
Payer: COMMERCIAL

## 2020-04-07 ENCOUNTER — OFFICE VISIT (OUTPATIENT)
Dept: FAMILY MEDICINE | Facility: OTHER | Age: 40
End: 2020-04-07
Attending: FAMILY MEDICINE
Payer: COMMERCIAL

## 2020-04-07 VITALS
HEART RATE: 92 BPM | TEMPERATURE: 97 F | SYSTOLIC BLOOD PRESSURE: 144 MMHG | WEIGHT: 251 LBS | BODY MASS INDEX: 35.93 KG/M2 | DIASTOLIC BLOOD PRESSURE: 90 MMHG | HEIGHT: 70 IN | OXYGEN SATURATION: 97 %

## 2020-04-07 DIAGNOSIS — M54.2 ACUTE NECK PAIN: ICD-10-CM

## 2020-04-07 DIAGNOSIS — M54.50 ACUTE MIDLINE LOW BACK PAIN WITHOUT SCIATICA: ICD-10-CM

## 2020-04-07 DIAGNOSIS — M54.12 CERVICAL RADICULOPATHY: ICD-10-CM

## 2020-04-07 DIAGNOSIS — M54.50 ACUTE MIDLINE LOW BACK PAIN WITHOUT SCIATICA: Primary | ICD-10-CM

## 2020-04-07 PROCEDURE — 72040 X-RAY EXAM NECK SPINE 2-3 VW: CPT

## 2020-04-07 PROCEDURE — 99213 OFFICE O/P EST LOW 20 MIN: CPT | Performed by: FAMILY MEDICINE

## 2020-04-07 PROCEDURE — 72100 X-RAY EXAM L-S SPINE 2/3 VWS: CPT

## 2020-04-07 RX ORDER — CYCLOBENZAPRINE HCL 10 MG
10 TABLET ORAL 3 TIMES DAILY PRN
Qty: 90 TABLET | Refills: 3 | Status: SHIPPED | OUTPATIENT
Start: 2020-04-07 | End: 2021-02-02

## 2020-04-07 RX ORDER — MELOXICAM 15 MG/1
15 TABLET ORAL DAILY
Qty: 90 TABLET | Refills: 0 | Status: SHIPPED | OUTPATIENT
Start: 2020-04-07 | End: 2021-02-02

## 2020-04-07 RX ORDER — ACETAMINOPHEN 500 MG
500-1000 TABLET ORAL PRN
COMMUNITY
End: 2021-02-02

## 2020-04-07 SDOH — HEALTH STABILITY: MENTAL HEALTH: HOW OFTEN DO YOU HAVE A DRINK CONTAINING ALCOHOL?: 4 OR MORE TIMES A WEEK

## 2020-04-07 ASSESSMENT — ENCOUNTER SYMPTOMS
FATIGUE: 0
FEVER: 0
NECK STIFFNESS: 1
WEAKNESS: 0
NECK PAIN: 1
BACK PAIN: 1

## 2020-04-07 ASSESSMENT — MIFFLIN-ST. JEOR: SCORE: 2044.78

## 2020-04-07 ASSESSMENT — PATIENT HEALTH QUESTIONNAIRE - PHQ9: SUM OF ALL RESPONSES TO PHQ QUESTIONS 1-9: 8

## 2020-04-07 ASSESSMENT — PAIN SCALES - GENERAL: PAINLEVEL: EXTREME PAIN (9)

## 2020-04-07 NOTE — PROGRESS NOTES
SUBJECTIVE:   Greg Mckenna is a 40 year old male who presents to clinic today for the following health issues:    HPI  Neck and low back pain.  Extends from neck into all 10 toes, especially with the neck chiro treatments.  Worse with turning neck and coughing.  Has had 6-8 chiro treatments without lasting relief, only an hour or so.  Is a , had to call in sick today.   Symptoms for a few weeks now.  Using Ibuprofen, not much help.  No bladder or bowel symptoms. He fell out of his deer stand last fall, mild pain then, no other injuries.      Patient Active Problem List    Diagnosis Date Noted     Bursitis 04/17/2018     Priority: Medium     Overview:   tenosynovistis right shoulder       Peptic ulcer disease 04/17/2018     Priority: Medium     Overweight 05/22/2012     Priority: Medium     Malaise and fatigue 05/22/2012     Priority: Medium     Elevated blood pressure reading without diagnosis of hypertension 05/22/2012     Priority: Medium     Major depressive disorder, single episode, moderate (H) 08/16/2011     Priority: Medium     Past Surgical History:   Procedure Laterality Date     ADENOIDECTOMY      x2     OTHER SURGICAL HISTORY      [id number],OTHER,x2     OTHER SURGICAL HISTORY      001154,OTHER     TONSILLECTOMY      No Comments Provided     Social History     Tobacco Use     Smoking status: Never Smoker     Smokeless tobacco: Never Used   Substance Use Topics     Alcohol use: Yes     Frequency: 4 or more times a week     Comment: frequent- couple beers a day      Current Outpatient Medications   Medication Sig Dispense Refill     acetaminophen (TYLENOL) 500 MG tablet Take 500-1,000 mg by mouth as needed for mild pain       No Known Allergies    Review of Systems   Constitutional: Negative for fatigue and fever.   Musculoskeletal: Positive for back pain, neck pain and neck stiffness.   Neurological: Negative for weakness.        OBJECTIVE:     BP (!) 144/90   Pulse 92   Temp 97  F (36.1  " C) (Tympanic)   Ht 1.778 m (5' 10\")   Wt 113.9 kg (251 lb)   HC 16 cm (6.3\")   SpO2 97%   BMI 36.01 kg/m    Body mass index is 36.01 kg/m .  Physical Exam  Constitutional:       Appearance: Normal appearance.   Musculoskeletal:      Comments: Neck range of motion reduced, rotation is about 40 degrees bilateral.  Mild pain on palpation over C 4/6 perispinous muscles.  Bilateral upper extremity strength is normal.    No lumbar pain on palpation, negative straight leg raise.  Normal lower extremity strength.    Neurological:      General: No focal deficit present.      Mental Status: He is alert and oriented to person, place, and time.   Psychiatric:         Mood and Affect: Mood normal.         Behavior: Behavior normal.         Thought Content: Thought content normal.         Diagnostic Test Results:  Xray - lumbar scoliosis and mild DDD, cervical x-ray with mild loss of lordosis and mild DD as well.    ASSESSMENT/PLAN:         (M54.5) Acute midline low back pain without sciatica  (primary encounter diagnosis)  Comment: he has some radiculopathy, I suspect from the neck mostly.  Will order a c-spine MRI for after covid.  Mobic and flexeril int he meantime.  Plan: XR Lumbar Spine 2/3 Views             (M54.2) Acute neck pain  Comment: progressing.  Plan: XR Cervical Spine 2/3 Views        Progressing.          (M54.12) Cervical radiculopathy  Comment: see above  Plan: MR Cervical Spine w/o Contrast        Positional symptoms into his toes.        Royal Odom MD  [facility] AND [facility]    "

## 2020-04-07 NOTE — NURSING NOTE
"Chief Complaint   Patient presents with     Neck Pain     Radiates into back and down bilateral legs      Patient is here for neck pain that radiates into back and down bilateral legs. He has seen a chiropractor about 6-8 times in the past 2 weeks without any success. He states it has been going on for a few weeks.     Initial BP (!) 146/102   Pulse 92   Temp 97  F (36.1  C) (Tympanic)   Ht 1.778 m (5' 10\")   Wt 113.9 kg (251 lb)   HC 16 cm (6.3\")   SpO2 97%   BMI 36.01 kg/m   Estimated body mass index is 36.01 kg/m  as calculated from the following:    Height as of this encounter: 1.778 m (5' 10\").    Weight as of this encounter: 113.9 kg (251 lb).  Medication Reconciliation: complete    Bettie Barrera MA  "

## 2020-09-24 ENCOUNTER — ALLIED HEALTH/NURSE VISIT (OUTPATIENT)
Dept: FAMILY MEDICINE | Facility: OTHER | Age: 40
End: 2020-09-24
Attending: FAMILY MEDICINE
Payer: COMMERCIAL

## 2020-09-24 DIAGNOSIS — Z20.822 ENCOUNTER FOR LABORATORY TESTING FOR COVID-19 VIRUS: Primary | ICD-10-CM

## 2020-09-24 PROCEDURE — U0003 INFECTIOUS AGENT DETECTION BY NUCLEIC ACID (DNA OR RNA); SEVERE ACUTE RESPIRATORY SYNDROME CORONAVIRUS 2 (SARS-COV-2) (CORONAVIRUS DISEASE [COVID-19]), AMPLIFIED PROBE TECHNIQUE, MAKING USE OF HIGH THROUGHPUT TECHNOLOGIES AS DESCRIBED BY CMS-2020-01-R: HCPCS | Mod: ZL | Performed by: FAMILY MEDICINE

## 2020-09-24 PROCEDURE — 99207 ZZC NO CHARGE NURSE ONLY: CPT

## 2020-09-24 PROCEDURE — C9803 HOPD COVID-19 SPEC COLLECT: HCPCS

## 2020-09-24 NOTE — PROGRESS NOTES
Patient swabbed for COVID-19 testing for cough and sore throat  Zaire Hayes LPN on 9/24/2020 at 4:54 PM

## 2020-09-26 LAB
SARS-COV-2 RNA SPEC QL NAA+PROBE: NOT DETECTED
SPECIMEN SOURCE: NORMAL

## 2020-12-27 ENCOUNTER — HEALTH MAINTENANCE LETTER (OUTPATIENT)
Age: 40
End: 2020-12-27

## 2021-02-02 ENCOUNTER — OFFICE VISIT (OUTPATIENT)
Dept: FAMILY MEDICINE | Facility: OTHER | Age: 41
End: 2021-02-02
Attending: PHYSICIAN ASSISTANT
Payer: COMMERCIAL

## 2021-02-02 VITALS
RESPIRATION RATE: 16 BRPM | BODY MASS INDEX: 34.96 KG/M2 | HEIGHT: 70 IN | SYSTOLIC BLOOD PRESSURE: 142 MMHG | WEIGHT: 244.2 LBS | DIASTOLIC BLOOD PRESSURE: 86 MMHG | HEART RATE: 82 BPM | TEMPERATURE: 98.4 F | OXYGEN SATURATION: 96 %

## 2021-02-02 DIAGNOSIS — M54.50 ACUTE MIDLINE LOW BACK PAIN WITHOUT SCIATICA: Primary | ICD-10-CM

## 2021-02-02 DIAGNOSIS — M54.12 CERVICAL RADICULOPATHY: ICD-10-CM

## 2021-02-02 DIAGNOSIS — M54.2 ACUTE NECK PAIN: ICD-10-CM

## 2021-02-02 PROCEDURE — 99213 OFFICE O/P EST LOW 20 MIN: CPT | Performed by: PHYSICIAN ASSISTANT

## 2021-02-02 ASSESSMENT — MIFFLIN-ST. JEOR: SCORE: 2013.93

## 2021-02-02 ASSESSMENT — ANXIETY QUESTIONNAIRES
7. FEELING AFRAID AS IF SOMETHING AWFUL MIGHT HAPPEN: NOT AT ALL
5. BEING SO RESTLESS THAT IT IS HARD TO SIT STILL: NOT AT ALL
GAD7 TOTAL SCORE: 0
IF YOU CHECKED OFF ANY PROBLEMS ON THIS QUESTIONNAIRE, HOW DIFFICULT HAVE THESE PROBLEMS MADE IT FOR YOU TO DO YOUR WORK, TAKE CARE OF THINGS AT HOME, OR GET ALONG WITH OTHER PEOPLE: NOT DIFFICULT AT ALL
6. BECOMING EASILY ANNOYED OR IRRITABLE: NOT AT ALL
3. WORRYING TOO MUCH ABOUT DIFFERENT THINGS: NOT AT ALL
1. FEELING NERVOUS, ANXIOUS, OR ON EDGE: NOT AT ALL
2. NOT BEING ABLE TO STOP OR CONTROL WORRYING: NOT AT ALL

## 2021-02-02 ASSESSMENT — PATIENT HEALTH QUESTIONNAIRE - PHQ9
5. POOR APPETITE OR OVEREATING: NOT AT ALL
SUM OF ALL RESPONSES TO PHQ QUESTIONS 1-9: 0

## 2021-02-02 ASSESSMENT — PAIN SCALES - GENERAL: PAINLEVEL: MILD PAIN (3)

## 2021-02-02 NOTE — NURSING NOTE
Patient presents to clinic with back and right hip pain x 1 week. This has been an ongoing problem for years. Would like MRI  Lalita Estrella LPN ....................  2/2/2021   2:11 PM

## 2021-02-02 NOTE — PROGRESS NOTES
"Nursing Notes:   Lalita Estrella LPN  2/2/2021  2:15 PM  Addendum  Patient presents to clinic with back and right hip pain x 1 week. This has been an ongoing problem for years. Would like MRI  Lalita Estrella LPN ....................  2/2/2021   2:11 PM      SUBJECTIVE:     HPI  Greg Mckenna is a 40 year old male who presents to clinic today for evaluation of continued, neck, shoulder, back and hip pain. Seen for this in 04/2020 where x-rays showed degenerative disease. Has not been taking anything for symptomatic relief. States pain initially developed a couple years ago but has been progressing. Has been working with chiro which provides short term relief. States he feels like \"his back is compressed\". Would now like to proceed forward with MRI as discussed at his last visit. No weakness, numbness or tingling. No issues with saddle anesthesia, loss of bowel or bladder function.       Review of Systems   Per HPI.    PAST MEDICAL HISTORY:   Past Medical History:   Diagnosis Date     Chronic sinusitis     No Comments Provided     Epididymitis     No Comments Provided     Personal history of other diseases of the female genital tract     Premature, born @ 23 weeks       PAST SURGICAL HISTORY:   Past Surgical History:   Procedure Laterality Date     ADENOIDECTOMY      x2     OTHER SURGICAL HISTORY      [id number],OTHER,x2     OTHER SURGICAL HISTORY      000298,OTHER     TONSILLECTOMY      No Comments Provided       FAMILY HISTORY:   Family History   Problem Relation Age of Onset     Hypertension Father         Hypertension     Other - See Comments Father         hypercholesterolemia/back problems     Other - See Comments Mother         MS     Diabetes Other         Diabetes,FH       SOCIAL HISTORY:   Social History     Tobacco Use     Smoking status: Never Smoker     Smokeless tobacco: Never Used   Substance Use Topics     Alcohol use: Yes     Frequency: 4 or more times a week     Comment: frequent- couple beers a " "day       No Known Allergies  No current outpatient medications on file.     No current facility-administered medications for this visit.        OBJECTIVE:     BP (!) 142/86   Pulse 82   Temp 98.4  F (36.9  C)   Resp 16   Ht 1.778 m (5' 10\")   Wt 110.8 kg (244 lb 3.2 oz)   SpO2 96%   BMI 35.04 kg/m    Body mass index is 35.04 kg/m .  Physical Exam  General: Pleasant, in no apparent distress.  Cardiovascular: Regular rate and rhythm with S1 equal to S2. No murmurs, friction rubs, or gallops.   Respiratory: Lungs are resonant and clear to auscultation bilaterally. No wheezes, crackles, or rhonchi.  Musculoskeletal: Limited ROM of neck and back. Pain to palpation over the lower cervical region. Mild pain over mid to low lumbar region.  Neurologic Exam: Non-focal, symmetric DTRs, normal gross motor, tone coordination and no visible tremor.  Skin: No jaundice, pallor, rashes, or lesions.  Psych: Appropriate mood and affect.        ASSESSMENT/PLAN:     1. Acute midline low back pain without sciatica  Continued back pain with radiculopathy. X-rays completed less than one year ago show degenerative disease. Will proceed forward with MRI for additional evaluation. Treatment plan will be made once results are received. Consider PT.   - MR Lumbar Spine w/o Contrast; Future    2. Acute neck pain  Continued neck pain with radiculopathy. X-rays completed less than one year ago show degenerative disease. Will proceed forward with MRI for additional evaluation. Treatment plan will be made once results are received.   - MR Cervical Spine w/o Contrast; Future    3. Cervical radiculopathy  As above.   - MR Cervical Spine w/o Contrast; Future        Zuleika Holguin PA-C  [facility] AND [facility]    "

## 2021-02-03 ENCOUNTER — HOSPITAL ENCOUNTER (OUTPATIENT)
Dept: MRI IMAGING | Facility: OTHER | Age: 41
End: 2021-02-03
Attending: PHYSICIAN ASSISTANT
Payer: COMMERCIAL

## 2021-02-03 DIAGNOSIS — M54.50 ACUTE MIDLINE LOW BACK PAIN WITHOUT SCIATICA: ICD-10-CM

## 2021-02-03 DIAGNOSIS — M54.2 ACUTE NECK PAIN: ICD-10-CM

## 2021-02-03 DIAGNOSIS — M54.12 CERVICAL RADICULOPATHY: ICD-10-CM

## 2021-02-03 PROCEDURE — 72141 MRI NECK SPINE W/O DYE: CPT

## 2021-02-03 PROCEDURE — 72148 MRI LUMBAR SPINE W/O DYE: CPT

## 2021-02-03 ASSESSMENT — ANXIETY QUESTIONNAIRES: GAD7 TOTAL SCORE: 0

## 2021-02-04 ENCOUNTER — TELEPHONE (OUTPATIENT)
Dept: FAMILY MEDICINE | Facility: OTHER | Age: 41
End: 2021-02-04

## 2021-02-04 NOTE — TELEPHONE ENCOUNTER
Patient returned call to get results of MRI dated 2/2/2021. Results were given, refer to note in imaging of MRI. Patient wants to think about what place he would like referral placed to and will call us back.  Lalita Estrella LPN ....................  2/4/2021   10:45 AM

## 2021-02-09 ENCOUNTER — TELEPHONE (OUTPATIENT)
Dept: FAMILY MEDICINE | Facility: OTHER | Age: 41
End: 2021-02-09

## 2021-02-09 DIAGNOSIS — M54.12 CERVICAL RADICULOPATHY: ICD-10-CM

## 2021-02-09 DIAGNOSIS — M51.26 PROTRUSION OF LUMBAR INTERVERTEBRAL DISC: ICD-10-CM

## 2021-02-09 DIAGNOSIS — M48.02 SPINAL STENOSIS OF CERVICAL REGION: ICD-10-CM

## 2021-02-09 DIAGNOSIS — M54.50 ACUTE MIDLINE LOW BACK PAIN WITHOUT SCIATICA: Primary | ICD-10-CM

## 2021-02-09 NOTE — TELEPHONE ENCOUNTER
[facility]-pt thought he had a ref for back issues    Please call and advise      Thank you  Jennifer Pagan on 2/9/2021 at 11:43 AM

## 2021-02-09 NOTE — TELEPHONE ENCOUNTER
Patient would like to go to [facility]. Please placed referral.  Lalita Estrella LPN ....................  2/9/2021   11:52 AM

## 2021-02-25 ENCOUNTER — TRANSFERRED RECORDS (OUTPATIENT)
Dept: HEALTH INFORMATION MANAGEMENT | Facility: OTHER | Age: 41
End: 2021-02-25

## 2021-03-03 ENCOUNTER — TELEPHONE (OUTPATIENT)
Dept: FAMILY MEDICINE | Facility: OTHER | Age: 41
End: 2021-03-03

## 2021-03-03 DIAGNOSIS — M54.50 ACUTE MIDLINE LOW BACK PAIN WITHOUT SCIATICA: Primary | ICD-10-CM

## 2021-03-03 DIAGNOSIS — M54.12 CERVICAL RADICULOPATHY: ICD-10-CM

## 2021-03-03 DIAGNOSIS — M48.02 SPINAL STENOSIS OF CERVICAL REGION: ICD-10-CM

## 2021-03-03 DIAGNOSIS — M51.26 PROTRUSION OF LUMBAR INTERVERTEBRAL DISC: ICD-10-CM

## 2021-03-03 NOTE — TELEPHONE ENCOUNTER
[facility] wanting orders for PT after seeing spine specialist. Please call to advise. Thank you.  Rachel Hanna

## 2021-03-05 ENCOUNTER — HOSPITAL ENCOUNTER (OUTPATIENT)
Dept: PHYSICAL THERAPY | Facility: OTHER | Age: 41
Setting detail: THERAPIES SERIES
End: 2021-03-05
Attending: PHYSICIAN ASSISTANT
Payer: COMMERCIAL

## 2021-03-05 DIAGNOSIS — M54.12 CERVICAL RADICULOPATHY: ICD-10-CM

## 2021-03-05 DIAGNOSIS — M51.26 PROTRUSION OF LUMBAR INTERVERTEBRAL DISC: ICD-10-CM

## 2021-03-05 DIAGNOSIS — M54.50 ACUTE MIDLINE LOW BACK PAIN WITHOUT SCIATICA: ICD-10-CM

## 2021-03-05 DIAGNOSIS — M48.02 SPINAL STENOSIS OF CERVICAL REGION: ICD-10-CM

## 2021-03-05 PROCEDURE — 97162 PT EVAL MOD COMPLEX 30 MIN: CPT | Mod: GP | Performed by: PHYSICAL THERAPIST

## 2021-03-05 PROCEDURE — 97110 THERAPEUTIC EXERCISES: CPT | Mod: GP | Performed by: PHYSICAL THERAPIST

## 2021-03-05 NOTE — INITIAL ASSESSMENTS
03/05/21 1200   General Information   Type of Visit Initial OP Ortho PT Evaluation   Start of Care Date 03/05/21   Referring Physician Chelsie,    Patient/Family Goals Statement Maintain the ability to work with less pain   Orders Evaluate and Treat   Medical Diagnosis Acute midline low back pain without sciatica M54.5 Cervical radiculopathy M54.12 Spinal stenosis of cervical region M48.02 Protrusion of lumbar intervertebral disc M51.26    Surgical/Medical history reviewed Yes   Precautions/Limitations no known precautions/limitations   Body Part(s)   Body Part(s) Cervical Spine;Lumbar Spine/SI   Presentation and Etiology   Pertinent history of current problem (include personal factors and/or comorbidities that impact the POC) Patient has cervical and lumbar spine pain 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  He reports the pain at least a 4 many times more aching shooting and stabbing pain and it is constant made worse with sitting walking lifting carrying needles or work tasks.  Made improvement with changing position or shifting his weight.  He is not currently doing any home exercise program to work on his neck or back.  He is quite frustrated with it overall he thinks is [place] of having surgery because he has met with a spinal specialist who is suggesting surgery for both cervical and lumbar spine.  He is fearful that because he does not know how he will handle that financially and to be able to maintain his job after that as well.   Prior Level of Function   Functional Level Prior Comment has minimal issues with mobility a couple years ago, has regressed in tolerance greatly in the last couple months.    Fall Risk Screen   Fall screen completed by PT   Is patient a fall risk? No   Abuse Screen (yes response referral indicated)   Feels  Unsafe at Home or Work/School no   Feels Threatened by Someone no   Does Anyone Try to Keep You From Having Contact with Others or Doing Things Outside Your Home? no   Physical Signs of Abuse Present no   Lumbar Spine/SI Objective Findings   Observation Patient sits with decreased lumbar lordosis rounded shoulders forward head he does not report or show any significant pain at this time but he said it is a constant ache and at times worsening.   Flexion ROM Has good flexion range of motion   Extension ROM extension is sore, but near full.   Repeated Extension-Standing ROM No change in symptoms   Repeated Flexion-Standing ROM No change in any radicular symptoms tightness in his back   Pelvic Screen Negative in supine and sitting   Lumbar/Hip/Knee/Foot Strength Comments Has good hip and knee strength   Hamstring Flexibility Mildly tight   Quadricep Flexibility Mildly tight   Piriformis Flexibility Moderately tight   SLR Sore but not significantly positive   Slump Test Negative   Spring Test Negative   Palpation Some increased tone of paraspinals   Cervical Spine   Observation A lot of motion at C5-7 with flexion extension due to poor posture and mechanisms of the neck   Posture Forward shoulders rounded shoulders forward neck posture   Cervical Flexion ROM Full flexion range of motion   Cervical Extension ROM Extension range of motion is to about 60 degrees with a lot of motion, and a C5-6-7   Cervical Right Side Bending ROM 45   Cervical Left Side Bending ROM 45 bilaterally with pain is patient points to the C5-6 region   Cervical Right Rotation ROM 45 rotation as well   Cervical Left Rotation ROM 45   Thoracic Extension ROM Moderately tight throughout the upper thoracic   Shoulder AROM Screen Can achieve full active range of motion of the left shoulder but has pain past 90 degrees flexion and abduction   Shoulder Abd (C5) Strength 4   Shoulder Add (C7) Strength 5   Shoulder ER (C5, C6) Strength 4 with pain    Shoulder IR (C5, C6) Strength 4+ with mild pain   Elbow Flexion (C5, C6) Strength 5   Elbow Extension (C7) Strength 4+   Upper Trapezius Flexibility Mildly tight   Levator Scapula Flexibility Moderately tight   Scalene Flexibility Moderately tight   Pectoralis Minor Flexibility Moderately tight   Vertebral Artery Test Negative   Alar Ligament Test Negative   Transverse Ligament Test Negative   Cervical Distraction Test Good stretch felt no pain   Planned Therapy Interventions   Planned Therapy Interventions balance training;gait training;joint mobilization;manual therapy;neuromuscular re-education;ROM;strengthening;stretching   Planned Modality Interventions   Planned Modality Interventions Cryotherapy;Hot packs;Electrical stimulation;Traction;Ultrasound   Planned Modality Interventions Comments per patient need and per PT clinical judgement.    Clinical Impression   Criteria for Skilled Therapeutic Interventions Met yes, treatment indicated   PT Diagnosis lumbar and cervical disc herniation, need to improve posture as he is straining dsics wtih posture and work tasks.    Influenced by the following impairments decreased tolerance to work and home duties.   Clinical Presentation Evolving/Changing   Clinical Presentation Rationale disc involvemetn and increasing s/s   Clinical Decision Making (Complexity) Moderate complexity   Predicted Duration of Therapy Intervention (days/wks) 1-2x/wk 8-12 weeks   Risk & Benefits of therapy have been explained Yes   Patient, Family & other staff in agreement with plan of care Yes   Clinical Impression Comments needs to improve posture and core strength and scap strength, to decrease strain on discs.   ORTHO GOALS   PT Ortho Eval Goals 1;2;3   Ortho Goal 1   Goal Identifier work   Goal Description patient will have improved toleranec to work with 50% less pain   Target Date 05/07/21   Ortho Goal 2   Goal Identifier pain   Goal Description pain will reduce to no more than 4/10,  during work tasks.   Target Date 05/07/21   Ortho Goal 3   Goal Identifier HEP   Goal Description pateint will perform HEP 3x/wk to improve mobility and reduce pain.   Target Date 05/07/21   Total Evaluation Time   PT Eval, Moderate Complexity Minutes (24388) 30

## 2021-03-16 ENCOUNTER — HOSPITAL ENCOUNTER (OUTPATIENT)
Dept: PHYSICAL THERAPY | Facility: OTHER | Age: 41
Setting detail: THERAPIES SERIES
End: 2021-03-16
Attending: PHYSICIAN ASSISTANT
Payer: COMMERCIAL

## 2021-03-16 PROCEDURE — 97012 MECHANICAL TRACTION THERAPY: CPT | Mod: GP | Performed by: PHYSICAL THERAPIST

## 2021-03-16 PROCEDURE — 97140 MANUAL THERAPY 1/> REGIONS: CPT | Mod: GP | Performed by: PHYSICAL THERAPIST

## 2021-03-16 PROCEDURE — 97110 THERAPEUTIC EXERCISES: CPT | Mod: GP | Performed by: PHYSICAL THERAPIST

## 2021-03-24 PROBLEM — M71.9 BURSITIS: Status: ACTIVE | Noted: 2018-04-17

## 2021-03-24 NOTE — PROGRESS NOTES
Assessment & Plan     1. Spinal stenosis of cervical region  Known severe cervical stenosis with associated cervical radiculopathy. Shoulder pains likely radicular however could also be related to impingement or bursitis based on hx of repetitive motion for years and exam findings as outlined below. Offered additional shoulder work up but patient prefers to proceed with cervical epidural steroid injection to see if that helps with both the neck and shoulder. Order placed. Encouraged symptomatic management in the meantime. Continue with PT. Will need surgery eventually, however spine surgeon stated could try conservative management for now.   - XR Cervical/Thoracic Epidural Inj Incl Imaging; Future    2. Cervical radiculopathy  - XR Cervical/Thoracic Epidural Inj Incl Imaging; Future      No follow-ups on file.    Zuleika Holguin PA-C  [facility] AND [facility]   Greg is a 41 year old who presents for the following health issues    HPI   Here for evaluation of left shoulder pain that he feels is associated with his known cervical severe spinal stenosis. Has had left shoulder pains for quite some time but has worsened more recently. [place]  and has used his left arm and shoulder repetitively for years. Hx of bursitis that has done well with injections. Has been going to PT for neck, shoulder, and low back. Therapist recommended trying an epidural injection and patient would like to try this now. Did meet with [facility] where he was told they would like to do surgery on both cervical and lumbar spine as soon as able but patient is struggling as he does not have short or long term disability through his work and would not be able to financially afford to have time off for recovery. Is working on conservative management for now.         PAST MEDICAL HISTORY:   Past Medical History:   Diagnosis Date     Chronic sinusitis     No Comments Provided     Epididymitis  "    No Comments Provided     Personal history of other diseases of the female genital tract     Premature, born @ 23 weeks       PAST SURGICAL HISTORY:   Past Surgical History:   Procedure Laterality Date     ADENOIDECTOMY      x2     OTHER SURGICAL HISTORY      590029,OTHER,x2     OTHER SURGICAL HISTORY      [id number],OTHER     TONSILLECTOMY      No Comments Provided       FAMILY HISTORY:   Family History   Problem Relation Age of Onset     Hypertension Father         Hypertension     Other - See Comments Father         hypercholesterolemia/back problems     Other - See Comments Mother         MS     Diabetes Other         Diabetes,FH       SOCIAL HISTORY:   Social History     Tobacco Use     Smoking status: Never Smoker     Smokeless tobacco: Never Used   Substance Use Topics     Alcohol use: Yes     Frequency: 4 or more times a week     Comment: frequent- couple beers a day       No Known Allergies  No current outpatient medications on file.     No current facility-administered medications for this visit.          Review of Systems   Per HPI.         Objective    /74   Pulse 78   Temp 97.5  F (36.4  C)   Resp 16   Ht 1.778 m (5' 10\")   Wt 112.8 kg (248 lb 9.6 oz)   SpO2 97%   BMI 35.67 kg/m    Body mass index is 35.67 kg/m .  Physical Exam   General: Pleasant, in no apparent distress.  Musculoskeletal: Posterior cervical tenderness to palpation. Mild tenderness throughout left shoulder. Positive Hawkin's and Empty can test on left. Limited overhead ROM of left shoulder compared to right.   Neurologic Exam: Non-focal, symmetric DTRs, normal gross motor, tone coordination and no visible tremor.  Psych: Appropriate mood and affect.            "

## 2021-03-25 ENCOUNTER — OFFICE VISIT (OUTPATIENT)
Dept: FAMILY MEDICINE | Facility: OTHER | Age: 41
End: 2021-03-25
Attending: PHYSICIAN ASSISTANT
Payer: COMMERCIAL

## 2021-03-25 VITALS
DIASTOLIC BLOOD PRESSURE: 74 MMHG | HEART RATE: 78 BPM | BODY MASS INDEX: 35.59 KG/M2 | WEIGHT: 248.6 LBS | SYSTOLIC BLOOD PRESSURE: 138 MMHG | OXYGEN SATURATION: 97 % | RESPIRATION RATE: 16 BRPM | HEIGHT: 70 IN | TEMPERATURE: 97.5 F

## 2021-03-25 DIAGNOSIS — M54.12 CERVICAL RADICULOPATHY: ICD-10-CM

## 2021-03-25 DIAGNOSIS — M48.02 SPINAL STENOSIS OF CERVICAL REGION: Primary | ICD-10-CM

## 2021-03-25 PROCEDURE — 99213 OFFICE O/P EST LOW 20 MIN: CPT | Performed by: PHYSICIAN ASSISTANT

## 2021-03-25 ASSESSMENT — PAIN SCALES - GENERAL: PAINLEVEL: MILD PAIN (3)

## 2021-03-25 ASSESSMENT — MIFFLIN-ST. JEOR: SCORE: 2028.89

## 2021-03-25 NOTE — NURSING NOTE
Patient presents to clinic with left shoulder pain, he states that it is because of his neck.  Lalita Estrella LPN ....................  3/25/2021   1:00 PM

## 2021-04-09 ENCOUNTER — HOSPITAL ENCOUNTER (OUTPATIENT)
Dept: GENERAL RADIOLOGY | Facility: OTHER | Age: 41
Discharge: HOME | End: 2021-04-09
Attending: PHYSICIAN ASSISTANT | Admitting: PHYSICIAN ASSISTANT
Payer: COMMERCIAL

## 2021-04-09 DIAGNOSIS — M54.12 CERVICAL RADICULOPATHY: ICD-10-CM

## 2021-04-09 DIAGNOSIS — M48.02 SPINAL STENOSIS OF CERVICAL REGION: ICD-10-CM

## 2021-04-09 PROCEDURE — 250N000011 HC RX IP 250 OP 636: Performed by: RADIOLOGY

## 2021-04-09 PROCEDURE — 250N000009 HC RX 250: Performed by: RADIOLOGY

## 2021-04-09 PROCEDURE — 255N000002 HC RX 255 OP 636: Performed by: RADIOLOGY

## 2021-04-09 PROCEDURE — 62321 NJX INTERLAMINAR CRV/THRC: CPT

## 2021-04-09 RX ORDER — METHYLPREDNISOLONE ACETATE 80 MG/ML
80 INJECTION, SUSPENSION INTRA-ARTICULAR; INTRALESIONAL; INTRAMUSCULAR; SOFT TISSUE ONCE
Status: COMPLETED | OUTPATIENT
Start: 2021-04-09 | End: 2021-04-09

## 2021-04-09 RX ORDER — LIDOCAINE HYDROCHLORIDE 10 MG/ML
2 INJECTION, SOLUTION INFILTRATION; PERINEURAL ONCE
Status: COMPLETED | OUTPATIENT
Start: 2021-04-09 | End: 2021-04-09

## 2021-04-09 RX ADMIN — IOHEXOL 2 ML: 240 INJECTION, SOLUTION INTRATHECAL; INTRAVASCULAR; INTRAVENOUS; ORAL at 13:54

## 2021-04-09 RX ADMIN — LIDOCAINE HYDROCHLORIDE 2 ML: 10 INJECTION, SOLUTION INFILTRATION; PERINEURAL at 13:54

## 2021-04-09 RX ADMIN — METHYLPREDNISOLONE ACETATE 80 MG: 80 INJECTION, SUSPENSION INTRA-ARTICULAR; INTRALESIONAL; INTRAMUSCULAR; SOFT TISSUE at 13:54

## 2021-09-01 NOTE — PROGRESS NOTES
Assessment & Plan     1. Cervical stenosis of spinal canal  Patient with chronic neck and back pain.  Does follow with [facility] specialist.  Presented today for paperwork to be completed for consideration of medical disability halfway.  Upon review of the paperwork it is not forms for provider to fill out rather for patient to fill out with his provider information and [facility] will contact us.  Did provide patient with copies of his most recent visits and MRI findings.  Follow-up as needed.    2. Protrusion of lumbar intervertebral disc  See #1.      Return if symptoms worsen or fail to improve.    Zuleika Holguin PA-C  [facility]   Greg is a 41 year old who presents for the following health issues     HPI   Here for paperwork completion.  Is requesting paperwork to be completed for him to be medically disabled retired through [employer] due to his chronic neck and back pain.  He was initially evaluated by myself in February 2021 where MRIs were completed.  Please review those notes for full details.  He was referred to physical therapy where he followed up in March and did have follow-up with myself at the end of March as well.  Notes are available in his chart.  He did get referred to [facility] spine specialist where they seen him on 2/25/2021.  Notes are available in his chart for review.  He presents with paperwork that is requesting my contact information for additional forms to be completed.  No additional information is needed on these forms.  Patient is also requesting copies of his recent visits and MRI results.  He continues to struggle with significant back and neck pain that is limiting his abilities to function and do his daily job as well as his daily tasks at home.    PAST MEDICAL HISTORY:   Past Medical History:   Diagnosis Date     Chronic sinusitis     No Comments Provided     Epididymitis     No Comments Provided     Personal history of other diseases  "of the female genital tract     Premature, born @ 23 weeks       PAST SURGICAL HISTORY:   Past Surgical History:   Procedure Laterality Date     ADENOIDECTOMY      x2     OTHER SURGICAL HISTORY      [id number],OTHER,x2     OTHER SURGICAL HISTORY      [id number],OTHER     TONSILLECTOMY      No Comments Provided       FAMILY HISTORY:   Family History   Problem Relation Age of Onset     Hypertension Father         Hypertension     Other - See Comments Father         hypercholesterolemia/back problems     Other - See Comments Mother         MS     Diabetes Other         Diabetes,FH       SOCIAL HISTORY:   Social History     Tobacco Use     Smoking status: Never Smoker     Smokeless tobacco: Never Used   Substance Use Topics     Alcohol use: Yes     Comment: frequent- couple beers a day       No Known Allergies  No current outpatient medications on file.     No current facility-administered medications for this visit.         Review of Systems   Per HPI        Objective    /84   Pulse 87   Temp (!) 96.4  F (35.8  C)   Resp 14   Ht 1.778 m (5' 10\")   Wt 113.4 kg (250 lb)   SpO2 98%   BMI 35.87 kg/m    Body mass index is 35.87 kg/m .  Physical Exam   General: Pleasant, in no apparent distress.  Psych: Appropriate mood and affect.            "

## 2021-09-02 ENCOUNTER — OFFICE VISIT (OUTPATIENT)
Dept: FAMILY MEDICINE | Facility: OTHER | Age: 41
End: 2021-09-02
Attending: PHYSICIAN ASSISTANT
Payer: COMMERCIAL

## 2021-09-02 VITALS
OXYGEN SATURATION: 98 % | WEIGHT: 250 LBS | HEIGHT: 70 IN | HEART RATE: 87 BPM | TEMPERATURE: 96.4 F | RESPIRATION RATE: 14 BRPM | SYSTOLIC BLOOD PRESSURE: 138 MMHG | BODY MASS INDEX: 35.79 KG/M2 | DIASTOLIC BLOOD PRESSURE: 84 MMHG

## 2021-09-02 DIAGNOSIS — M48.02 CERVICAL STENOSIS OF SPINAL CANAL: Primary | ICD-10-CM

## 2021-09-02 DIAGNOSIS — M51.26 PROTRUSION OF LUMBAR INTERVERTEBRAL DISC: ICD-10-CM

## 2021-09-02 PROCEDURE — 99212 OFFICE O/P EST SF 10 MIN: CPT | Performed by: PHYSICIAN ASSISTANT

## 2021-09-02 ASSESSMENT — PAIN SCALES - GENERAL: PAINLEVEL: SEVERE PAIN (6)

## 2021-09-02 ASSESSMENT — ANXIETY QUESTIONNAIRES
1. FEELING NERVOUS, ANXIOUS, OR ON EDGE: NOT AT ALL
IF YOU CHECKED OFF ANY PROBLEMS ON THIS QUESTIONNAIRE, HOW DIFFICULT HAVE THESE PROBLEMS MADE IT FOR YOU TO DO YOUR WORK, TAKE CARE OF THINGS AT HOME, OR GET ALONG WITH OTHER PEOPLE: NOT DIFFICULT AT ALL
GAD7 TOTAL SCORE: 0
6. BECOMING EASILY ANNOYED OR IRRITABLE: NOT AT ALL
3. WORRYING TOO MUCH ABOUT DIFFERENT THINGS: NOT AT ALL
7. FEELING AFRAID AS IF SOMETHING AWFUL MIGHT HAPPEN: NOT AT ALL
5. BEING SO RESTLESS THAT IT IS HARD TO SIT STILL: NOT AT ALL
2. NOT BEING ABLE TO STOP OR CONTROL WORRYING: NOT AT ALL

## 2021-09-02 ASSESSMENT — MIFFLIN-ST. JEOR: SCORE: 2035.24

## 2021-09-02 ASSESSMENT — PATIENT HEALTH QUESTIONNAIRE - PHQ9: 5. POOR APPETITE OR OVEREATING: NOT AT ALL

## 2021-09-02 NOTE — NURSING NOTE
Patient presents to clinic with forms to be filled out.  Lalita Estrella LPN ....................  9/2/2021   9:42 AM

## 2021-09-03 ASSESSMENT — ANXIETY QUESTIONNAIRES: GAD7 TOTAL SCORE: 0

## 2021-09-13 ENCOUNTER — OFFICE VISIT (OUTPATIENT)
Dept: FAMILY MEDICINE | Facility: OTHER | Age: 41
End: 2021-09-13
Attending: NURSE PRACTITIONER
Payer: COMMERCIAL

## 2021-09-13 VITALS
TEMPERATURE: 101 F | SYSTOLIC BLOOD PRESSURE: 134 MMHG | HEART RATE: 84 BPM | DIASTOLIC BLOOD PRESSURE: 90 MMHG | WEIGHT: 243.44 LBS | BODY MASS INDEX: 34.93 KG/M2 | RESPIRATION RATE: 22 BRPM | OXYGEN SATURATION: 97 %

## 2021-09-13 DIAGNOSIS — R50.9 FEVER AND CHILLS: ICD-10-CM

## 2021-09-13 DIAGNOSIS — R06.02 SHORTNESS OF BREATH: ICD-10-CM

## 2021-09-13 DIAGNOSIS — Z20.822 SUSPECTED COVID-19 VIRUS INFECTION: ICD-10-CM

## 2021-09-13 DIAGNOSIS — R05.9 COUGH: ICD-10-CM

## 2021-09-13 DIAGNOSIS — U07.1 COVID-19: Primary | ICD-10-CM

## 2021-09-13 PROCEDURE — U0003 INFECTIOUS AGENT DETECTION BY NUCLEIC ACID (DNA OR RNA); SEVERE ACUTE RESPIRATORY SYNDROME CORONAVIRUS 2 (SARS-COV-2) (CORONAVIRUS DISEASE [COVID-19]), AMPLIFIED PROBE TECHNIQUE, MAKING USE OF HIGH THROUGHPUT TECHNOLOGIES AS DESCRIBED BY CMS-2020-01-R: HCPCS | Mod: ZL | Performed by: NURSE PRACTITIONER

## 2021-09-13 PROCEDURE — C9803 HOPD COVID-19 SPEC COLLECT: HCPCS

## 2021-09-13 PROCEDURE — 99213 OFFICE O/P EST LOW 20 MIN: CPT | Performed by: NURSE PRACTITIONER

## 2021-09-13 RX ORDER — ALBUTEROL SULFATE 90 UG/1
2 AEROSOL, METERED RESPIRATORY (INHALATION) EVERY 4 HOURS PRN
Qty: 8.5 G | Refills: 0 | Status: SHIPPED | OUTPATIENT
Start: 2021-09-13

## 2021-09-13 ASSESSMENT — PAIN SCALES - GENERAL: PAINLEVEL: MODERATE PAIN (5)

## 2021-09-13 NOTE — NURSING NOTE
"Patient presents to the clinic for wet/dry cough, fever and headaches for the past couple of days.    FOOD SECURITY SCREENING QUESTIONS  Hunger Vital Signs:  Within the past 12 months we worried whether our food would run out before we got money to buy more. Never  Within the past 12 months the food we bought just didn't last and we didn't have money to get more. Never    Advance Care Directive on file? no  Advance Care Directive provided to patient? Declined.    Chief Complaint   Patient presents with     Cough     Fever       Initial BP (!) 134/90 (BP Location: Left arm, Patient Position: Sitting, Cuff Size: Adult Large)   Pulse 84   Temp (!) 101  F (38.3  C) (Tympanic)   Resp 22   Wt 110.4 kg (243 lb 7 oz)   SpO2 97%   BMI 34.93 kg/m   Estimated body mass index is 34.93 kg/m  as calculated from the following:    Height as of 9/2/21: 1.778 m (5' 10\").    Weight as of this encounter: 110.4 kg (243 lb 7 oz).  Medication Reconciliation: complete    Michelle Lomeli LPN       "

## 2021-09-13 NOTE — PROGRESS NOTES
HPI:    Greg Mckenna is a 41 year old male  who presents to [facility] today for covid vs pneumonia    States symptoms started yesterday or the day prior.  Symptoms include severe frequent non productive cough, severe headache and pressure, runny nose, mild sore throat, chest tightness, chest heaviness, shortness of breath, hard time catching breath with coughing, hard coughing fits, mild nausea, fatigue, weak feeling, and body aches.  No vomiting.  Normal appetite.  No loss of taste or smell.  Ear pressure and pain with coughing.    No noted fevers at home but fever of 101 in clinic.  Minimal chills yesterday.  States hx of pneumonia a few times over the past few years.  No tobacco use  No known covid exposure  Non-Covid vaccinated   Taking tylenol severe cold and dayquil           Past Medical History:   Diagnosis Date     Chronic sinusitis     No Comments Provided     Epididymitis     No Comments Provided     Personal history of other diseases of the female genital tract     Premature, born @ 23 weeks     Past Surgical History:   Procedure Laterality Date     ADENOIDECTOMY      x2     OTHER SURGICAL HISTORY      [id number],OTHER,x2     OTHER SURGICAL HISTORY      [id number],OTHER     TONSILLECTOMY      No Comments Provided     Social History     Tobacco Use     Smoking status: Never Smoker     Smokeless tobacco: Never Used   Substance Use Topics     Alcohol use: Yes     Comment: 12 pack per week     Current Outpatient Medications   Medication Sig Dispense Refill     albuterol (PROAIR HFA/PROVENTIL HFA/VENTOLIN HFA) 108 (90 Base) MCG/ACT inhaler Inhale 2 puffs into the lungs every 4 hours as needed for shortness of breath / dyspnea or wheezing 8.5 g 0     No Known Allergies      Past medical history, past surgical history, current medications and allergies reviewed and accurate to the best of my knowledge.        ROS:  Refer to HPI    BP (!) 134/90 (BP Location: Left arm, Patient Position: Sitting, Cuff Size: Adult  Large)   Pulse 84   Temp (!) 101  F (38.3  C) (Tympanic)   Resp 22   Wt 110.4 kg (243 lb 7 oz)   SpO2 97%   BMI 34.93 kg/m      EXAM:  General Appearance: Miserable but nontoxic appearing adult male, appropriate appearance for age. No acute distress  Ears: Left TM intact, translucent with bony landmarks appreciated, no erythema, no effusion, no bulging, no purulence.  Right TM intact, translucent with bony landmarks appreciated, no erythema, no effusion, no bulging, no purulence.  Left auditory canal clear.  Right auditory canal clear.  Normal external ears, non tender.  Eyes: conjunctivae normal without erythema or irritation, corneas clear, no drainage or crusting, no eyelid swelling, pupils equal   Orophayrnx: moist mucous membranes, posterior pharynx with erythema, tonsils with hypertrophy, tonsils with erythema, no exudates or petechiae, no post nasal drip seen, no trismus, voice clear.    Sinuses:  No sinus tenderness upon palpation of the frontal or maxillary sinuses  Nose:  drainage and congestion   Neck: supple without adenopathy  Respiratory: normal chest wall and respirations.  Normal effort.  Clear but tight to auscultation bilaterally, no wheezing, crackles or rhonchi.  Occasional non productive cough appreciated.  Cardiac: RRR with no murmurs  Musculoskeletal:  Equal movement of bilateral upper extremities.  Equal movement of bilateral lower extremities.  Normal gait.    Psychological: normal affect, alert, oriented, and pleasant.       Labs:  Covid test pending    Xray:  Not clinically indicated at this time      ASSESSMENT/PLAN:    I have reviewed the nursing notes.  I have reviewed the findings, diagnosis, plan and need for follow up with the patient.    1. Fever and chills    - Symptomatic COVID-19 Virus (Coronavirus) by PCR Nose    2. Cough    - Symptomatic COVID-19 Virus (Coronavirus) by PCR Nose    - albuterol (PROAIR HFA/PROVENTIL HFA/VENTOLIN HFA) 108 (90 Base) MCG/ACT inhaler; Inhale 2  puffs into the lungs every 4 hours as needed for shortness of breath / dyspnea or wheezing  Dispense: 8.5 g; Refill: 0    3. Shortness of breath      - albuterol (PROAIR HFA/PROVENTIL HFA/VENTOLIN HFA) 108 (90 Base) MCG/ACT inhaler; Inhale 2 puffs into the lungs every 4 hours as needed for shortness of breath / dyspnea or wheezing  Dispense: 8.5 g; Refill: 0    4. Suspected COVID-19 virus infection      Patient with cough and respiratory symptoms and fever for 24 to 36 hours that are consistent with Covid and unvaccinated adult male.  Covid test is still pending.  Patient is currently stable and does not meet admission criteria.  Recommend symptomatic treatment at this time including use of the albuterol inhaler, sleeping upright, use of a humidifier, use of over-the-counter expectorant, staying well-hydrated with extra fluids, use of topical vapor rub, use of over-the-counter Tylenol and ibuprofen, etc.  Discussed with patient no clinical indications for chest x-ray at this time.  Discussed with patient no clinical indications for antibiotics at this time.    Discussed with patient low threshold to follow-up if any worsening of symptoms or concerns    5. Covid -19  Addendum:  Positive covid test  Mona Roman NP on 9/18/2021 at 9:53 AM      I explained my diagnostic considerations and recommendations to the patient, who voiced understanding and agreement with the treatment plan. All questions were answered. We discussed potential side effects of any prescribed or recommended therapies, as well as expectations for response to treatments.

## 2021-09-15 LAB — SARS-COV-2 RNA RESP QL NAA+PROBE: POSITIVE

## 2021-10-09 ENCOUNTER — HEALTH MAINTENANCE LETTER (OUTPATIENT)
Age: 41
End: 2021-10-09

## 2022-01-23 ENCOUNTER — OFFICE VISIT (OUTPATIENT)
Dept: FAMILY MEDICINE | Facility: OTHER | Age: 42
End: 2022-01-23
Attending: NURSE PRACTITIONER
Payer: COMMERCIAL

## 2022-01-23 ENCOUNTER — HOSPITAL ENCOUNTER (OUTPATIENT)
Dept: GENERAL RADIOLOGY | Facility: OTHER | Age: 42
End: 2022-01-23
Attending: FAMILY MEDICINE
Payer: COMMERCIAL

## 2022-01-23 VITALS
WEIGHT: 243.19 LBS | TEMPERATURE: 97.5 F | DIASTOLIC BLOOD PRESSURE: 110 MMHG | RESPIRATION RATE: 20 BRPM | OXYGEN SATURATION: 95 % | BODY MASS INDEX: 34.82 KG/M2 | SYSTOLIC BLOOD PRESSURE: 166 MMHG | HEART RATE: 90 BPM | HEIGHT: 70 IN

## 2022-01-23 DIAGNOSIS — M25.461 EFFUSION OF RIGHT KNEE: ICD-10-CM

## 2022-01-23 DIAGNOSIS — M25.461 EFFUSION OF RIGHT KNEE: Primary | ICD-10-CM

## 2022-01-23 DIAGNOSIS — R03.0 ELEVATED BLOOD PRESSURE READING WITHOUT DIAGNOSIS OF HYPERTENSION: ICD-10-CM

## 2022-01-23 DIAGNOSIS — M10.061 ACUTE IDIOPATHIC GOUT OF RIGHT KNEE: ICD-10-CM

## 2022-01-23 LAB
CRP SERPL-MCNC: 8.4 MG/L
URATE SERPL-MCNC: 8.3 MG/DL (ref 4.4–7.6)

## 2022-01-23 PROCEDURE — 36415 COLL VENOUS BLD VENIPUNCTURE: CPT | Mod: ZL | Performed by: FAMILY MEDICINE

## 2022-01-23 PROCEDURE — 99213 OFFICE O/P EST LOW 20 MIN: CPT | Mod: 25 | Performed by: FAMILY MEDICINE

## 2022-01-23 PROCEDURE — 86618 LYME DISEASE ANTIBODY: CPT | Mod: ZL | Performed by: FAMILY MEDICINE

## 2022-01-23 PROCEDURE — 20610 DRAIN/INJ JOINT/BURSA W/O US: CPT | Mod: RT | Performed by: FAMILY MEDICINE

## 2022-01-23 PROCEDURE — 73560 X-RAY EXAM OF KNEE 1 OR 2: CPT | Mod: LT

## 2022-01-23 PROCEDURE — 250N000011 HC RX IP 250 OP 636: Performed by: FAMILY MEDICINE

## 2022-01-23 PROCEDURE — 250N000009 HC RX 250: Performed by: FAMILY MEDICINE

## 2022-01-23 PROCEDURE — 86140 C-REACTIVE PROTEIN: CPT | Mod: ZL | Performed by: FAMILY MEDICINE

## 2022-01-23 PROCEDURE — 84550 ASSAY OF BLOOD/URIC ACID: CPT | Mod: ZL | Performed by: FAMILY MEDICINE

## 2022-01-23 RX ORDER — LIDOCAINE HYDROCHLORIDE 10 MG/ML
5 INJECTION, SOLUTION EPIDURAL; INFILTRATION; INTRACAUDAL; PERINEURAL ONCE
Status: COMPLETED | OUTPATIENT
Start: 2022-01-23 | End: 2022-01-23

## 2022-01-23 RX ORDER — METHYLPREDNISOLONE ACETATE 80 MG/ML
80 INJECTION, SUSPENSION INTRA-ARTICULAR; INTRALESIONAL; INTRAMUSCULAR; SOFT TISSUE ONCE
Status: COMPLETED | OUTPATIENT
Start: 2022-01-23 | End: 2022-01-23

## 2022-01-23 RX ADMIN — METHYLPREDNISOLONE ACETATE 80 MG: 80 INJECTION, SUSPENSION INTRA-ARTICULAR; INTRALESIONAL; INTRAMUSCULAR; SOFT TISSUE at 15:30

## 2022-01-23 RX ADMIN — LIDOCAINE HYDROCHLORIDE 5 ML: 10 INJECTION, SOLUTION EPIDURAL; INFILTRATION; INTRACAUDAL; PERINEURAL at 19:15

## 2022-01-23 ASSESSMENT — PAIN SCALES - GENERAL: PAINLEVEL: EXTREME PAIN (8)

## 2022-01-23 ASSESSMENT — MIFFLIN-ST. JEOR: SCORE: 2004.34

## 2022-01-23 NOTE — PROGRESS NOTES
"  Assessment & Plan   Problem List Items Addressed This Visit        Other    Elevated blood pressure reading without diagnosis of hypertension      Other Visit Diagnoses     Effusion of right knee    -  Primary    Relevant Medications    methylPREDNISolone (DEPO-MEDROL) injection 80 mg    Other Relevant Orders    XR Knee Standing 2v  Bilateral & 2v Right (Completed)    Lyme Disease Ab with reflex to WB Serum    Uric acid (Completed)    CRP inflammation (Completed)    Large Joint/Bursa injection and/or drainage (Shoulder, Knee) (Completed)    Acute idiopathic gout of right knee        Relevant Medications    methylPREDNISolone (DEPO-MEDROL) injection 80 mg         The steroid injection should treat presumed gout (high serum uric acid).  Can follow up as needed.  Discussed with him some diet changes that can help prevent future flares.             BMI:   Estimated body mass index is 34.89 kg/m  as calculated from the following:    Height as of this encounter: 1.778 m (5' 10\").    Weight as of this encounter: 110.3 kg (243 lb 3 oz).           Return in about 4 weeks (around 2/20/2022).    Royal Odom MD  [facility]   Greg is a 41 year old who presents for the following health issues     HPI right knee pain for 3 days.  Pain significant, at 8/10. Using heat and ice, elevation, Ibuprofen.  Not helping.  Cannot fully bend it.  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  No recent trauma.   No fevers.   Some hip, neck and back pain chronic.  Pain is worst in lateral joint area.  No fevers.          Review of Systems         Objective    BP (!) 166/110 (BP Location: Right arm, Patient Position: Sitting, Cuff Size: Adult Large)   Pulse 90   Temp 97.5  F (36.4  C) (Tympanic)   Resp 20   Ht 1.778 m (5' 10\")   Wt 110.3 kg (243 lb 3 oz)   SpO2 95%   BMI 34.89 kg/m    Body mass index is 34.89 kg/m .  Physical Exam  Constitutional:       Appearance: Normal appearance. "   Musculoskeletal:      Comments: Right knee with about 175 degrees of extension and 90 of flexion.  moderate effusion on palpation.  Pain diffusely. Not red and no warmth.  Stable on varus and valgus stressing.  Mild pain on Gerson testing bilateral.    Neurological:      General: No focal deficit present.      Mental Status: He is alert and oriented to person, place, and time.   Psychiatric:         Mood and Affect: Mood normal.         Behavior: Behavior normal.         Thought Content: Thought content normal.        X-ray is normal.  Discussed with him options, including oral NSAIDS, labs or arthrocentesis.  He consented to his after we talked about risks.  Right knee prepped and I was unable to extract fluid.  jiont was infiltrated with 4 ml 1% lidocaine and 80 milligram depotmedrol.        Results for orders placed or performed in visit on 01/23/22 (from the past 24 hour(s))   Uric acid   Result Value Ref Range    Uric Acid 8.3 (H) 4.4 - 7.6 mg/dL   CRP inflammation   Result Value Ref Range    CRP Inflammation 8.4 <10.0 mg/L

## 2022-01-23 NOTE — NURSING NOTE
"Chief Complaint   Patient presents with     Musculoskeletal Problem     right knee, thigh pain rated 8, unable to bend x 3 days         Initial BP (!) 176/108 (BP Location: Right arm, Patient Position: Sitting, Cuff Size: Adult Large)   Pulse 89   Temp 97.5  F (36.4  C) (Tympanic)   Resp 20   Ht 1.778 m (5' 10\")   Wt 110.3 kg (243 lb 3 oz)   SpO2 95%   BMI 34.89 kg/m   Estimated body mass index is 34.89 kg/m  as calculated from the following:    Height as of this encounter: 1.778 m (5' 10\").    Weight as of this encounter: 110.3 kg (243 lb 3 oz).       FOOD SECURITY SCREENING QUESTIONS:    The next two questions are to help us understand your food security.  If you are feeling you need any assistance in this area, we have resources available to support you today.    Hunger Vital Signs:  Within the past 12 months we worried whether our food would run out before we got money to buy more. Never  Within the past 12 months the food we bought just didn't last and we didn't have money to get more. Never      Medication Reconciliation: Complete      Koki Ochoa LPN  "

## 2022-01-24 LAB — B BURGDOR IGG+IGM SER QL: 0.33

## 2022-01-29 ENCOUNTER — HEALTH MAINTENANCE LETTER (OUTPATIENT)
Age: 42
End: 2022-01-29

## 2022-05-10 NOTE — PROGRESS NOTES
Assessment & Plan     1. Elevated blood pressure reading without diagnosis of hypertension  Blood pressure elevated in clinic at 154/104.  Has inconsistently been elevated at previous clinic visits but patient was in pain at that time.  Recommend closely monitoring blood pressure with home blood pressure monitor over the next 2 to 3 weeks.  If consistently above goal of less than 130/80 will start a medication at that time.  Encourage healthy diet and exercise, limiting sodium intake.  Follow-up for recheck in 2 to 3 weeks.  - Home Blood Pressure Monitor Order for DME - ONLY FOR DME    2. Lump of right breast  Palpable lump under right nipple/areolar region.  We will pursue breast imaging for additional evaluation.  Encourage symptomatic management in the meantime.  - US Breast Right Limited 1-3 Quadrants; Future  - MA Diagnostic Digital Right; Future    Return if symptoms worsen or fail to improve.    Zuleika Holguin PA-C  [facility]    Fernando Garza is a 42 year old who presents for the following health issues     History of Present Illness       Reason for visit:  Lump in chest  Symptoms include:  Lump in chest  Symptom intensity:  Moderate  Symptom progression:  Staying the same  Had these symptoms before:  No  What makes it worse:  No  What makes it better:  No    He eats 0-1 servings of fruits and vegetables daily.He consumes 2 sweetened beverage(s) daily.He exercises with enough effort to increase his heart rate 9 or less minutes per day.  He exercises with enough effort to increase his heart rate 3 or less days per week.   He is taking medications regularly.     Here for evaluation of lump in right breast under his right nipple that has been present for 1-1/2 to 2 weeks.  Feels that it may be getting larger.  Associated pain, no overlying skin changes, nipple discharge or retraction.  No associated fever.  No family history of breast cancer.  Patient also has concerns with  "his blood pressure today which was elevated 154/104.  Has been on and off elevated at previous clinic visits.  No personal history of hypertension.  Reports father does have a history of hypertension.  Still struggling with quite a bit of neck and back pain.  He is working on obtaining medical disability.  No chest pain or pressure, palpitations, dizziness, lightheadedness, syncope, headaches, vision changes, shortness of breath.      PAST MEDICAL HISTORY:   Past Medical History:   Diagnosis Date     Chronic sinusitis     No Comments Provided     Epididymitis     No Comments Provided     Personal history of other diseases of the female genital tract     Premature, born @ 23 weeks       PAST SURGICAL HISTORY:   Past Surgical History:   Procedure Laterality Date     ADENOIDECTOMY      x2     OTHER SURGICAL HISTORY      [id number],OTHER,x2     OTHER SURGICAL HISTORY      [id number],OTHER     TONSILLECTOMY      No Comments Provided       FAMILY HISTORY:   Family History   Problem Relation Age of Onset     Hypertension Father         Hypertension     Other - See Comments Father         hypercholesterolemia/back problems     Other - See Comments Mother         MS     Diabetes Other         Diabetes,FH       SOCIAL HISTORY:   Social History     Tobacco Use     Smoking status: Never Smoker     Smokeless tobacco: Never Used   Substance Use Topics     Alcohol use: Yes     Comment: 12 pack per week      No Known Allergies  Current Outpatient Medications   Medication     albuterol (PROAIR HFA/PROVENTIL HFA/VENTOLIN HFA) 108 (90 Base) MCG/ACT inhaler     No current facility-administered medications for this visit.         Review of Systems   Per HPI        Objective    BP (!) 154/104   Pulse 82   Temp 97.1  F (36.2  C)   Resp 14   Ht 1.778 m (5' 10\")   Wt 111.7 kg (246 lb 3.2 oz)   SpO2 97%   BMI 35.33 kg/m    Body mass index is 35.33 kg/m .  Physical Exam   General: Pleasant, in no apparent distress.  Breast: Palpable soft, " movable mass under right areolar region with tenderness palpation.  No associated overlying skin changes.  No other palpable masses.  No nipple discharge or retraction, dimpling.  No axillary lymphadenopathy.  Skin: No jaundice, pallor, rashes, or lesions.  Psych: Appropriate mood and affect.

## 2022-05-11 ENCOUNTER — OFFICE VISIT (OUTPATIENT)
Dept: FAMILY MEDICINE | Facility: OTHER | Age: 42
End: 2022-05-11
Attending: PHYSICIAN ASSISTANT
Payer: COMMERCIAL

## 2022-05-11 VITALS
BODY MASS INDEX: 35.24 KG/M2 | HEART RATE: 82 BPM | WEIGHT: 246.2 LBS | OXYGEN SATURATION: 97 % | SYSTOLIC BLOOD PRESSURE: 154 MMHG | RESPIRATION RATE: 14 BRPM | TEMPERATURE: 97.1 F | HEIGHT: 70 IN | DIASTOLIC BLOOD PRESSURE: 104 MMHG

## 2022-05-11 DIAGNOSIS — R03.0 ELEVATED BLOOD PRESSURE READING WITHOUT DIAGNOSIS OF HYPERTENSION: Primary | ICD-10-CM

## 2022-05-11 DIAGNOSIS — N63.10 LUMP OF RIGHT BREAST: ICD-10-CM

## 2022-05-11 PROCEDURE — 99214 OFFICE O/P EST MOD 30 MIN: CPT | Performed by: PHYSICIAN ASSISTANT

## 2022-05-11 ASSESSMENT — PAIN SCALES - GENERAL: PAINLEVEL: MILD PAIN (2)

## 2022-05-19 ENCOUNTER — HOSPITAL ENCOUNTER (OUTPATIENT)
Dept: MAMMOGRAPHY | Facility: OTHER | Age: 42
Discharge: HOME | End: 2022-05-19
Attending: PHYSICIAN ASSISTANT
Payer: COMMERCIAL

## 2022-05-19 DIAGNOSIS — N63.10 LUMP OF RIGHT BREAST: ICD-10-CM

## 2022-05-19 PROCEDURE — 77062 BREAST TOMOSYNTHESIS BI: CPT

## 2022-09-17 ENCOUNTER — HEALTH MAINTENANCE LETTER (OUTPATIENT)
Age: 42
End: 2022-09-17

## 2022-09-20 ENCOUNTER — VIRTUAL VISIT (OUTPATIENT)
Dept: FAMILY MEDICINE | Facility: OTHER | Age: 42
End: 2022-09-20
Attending: PHYSICIAN ASSISTANT
Payer: COMMERCIAL

## 2022-09-20 DIAGNOSIS — M48.02 CERVICAL STENOSIS OF SPINAL CANAL: Primary | ICD-10-CM

## 2022-09-20 DIAGNOSIS — M51.26 PROTRUDED LUMBAR DISC: ICD-10-CM

## 2022-09-20 PROCEDURE — 99212 OFFICE O/P EST SF 10 MIN: CPT | Mod: 95 | Performed by: PHYSICIAN ASSISTANT

## 2022-09-20 ASSESSMENT — PATIENT HEALTH QUESTIONNAIRE - PHQ9: SUM OF ALL RESPONSES TO PHQ QUESTIONS 1-9: 0

## 2022-09-20 NOTE — PROGRESS NOTES
Greg is a 42 year old who is being evaluated via a billable telephone visit.      What phone number would you like to be contacted at? [phone]  How would you like to obtain your AVS? Dorothea    Assessment & Plan     1. Cervical stenosis of spinal canal  Paperwork completed to allow for patient to use remaining pain time off until his medical MCFP kicks in on 9/30/2022 secondary to his inability to perform work due to cervical stenosis and lumbar disc protrusion.  Paperwork will be mailed in prepain and preaddress time below.  Follow-up as needed.    2. Protruded lumbar disc  See #1      No follow-ups on file.    Zuleika Holguin PA-C  [facility]    Subjective   Greg is a 42 year old, presenting for the following health issues:  No chief complaint on file.      HPI   Contacted via telephone for completion of paperwork.  Patient has been cleared for medical MCFP through the [employer] due to inability to perform his job functions secondary to chronic back and neck pain..  Patient reports he has required to use his remaining PTO until the MCFP will kick in.  He has paperwork to be completed to reflect this starting from the dates of 8/31/2022 through 9/30/2022.    Review of Systems   Per HPI        Objective           Vitals:  No vitals were obtained today due to virtual visit.    Physical Exam   healthy, alert and no distress  PSYCH: Alert and oriented times 3; coherent speech, normal   rate and volume, able to articulate logical thoughts, able   to abstract reason, no tangential thoughts, no hallucinations   or delusions  His affect is normal  RESP: No cough, no audible wheezing, able to talk in full sentences  Remainder of exam unable to be completed due to telephone visits          Phone call duration: 5 minutes

## 2022-09-20 NOTE — NURSING NOTE
Patient is requesting completion of paperwork due to  disability.  Lalita Estrella LPN ....................  9/20/2022   1:55 PM

## 2022-10-06 ENCOUNTER — TELEPHONE (OUTPATIENT)
Dept: FAMILY MEDICINE | Facility: OTHER | Age: 42
End: 2022-10-06

## 2022-10-06 NOTE — TELEPHONE ENCOUNTER
Please call the patient.  The county needs a letter stating he  Is unable to work.      Ca Hayes on 10/6/2022 at 12:40 PM

## 2022-10-06 NOTE — TELEPHONE ENCOUNTER
Spoke with patient and he is needing [facility] to explain that he is unable to work and this started about 1 and a half months ago.He is aware that [facility] is out of office and will address when she returns on Monday.  Lalita Estrella LPN ....................  10/6/2022   12:49 PM

## 2022-10-10 NOTE — TELEPHONE ENCOUNTER
Paperwork was already completed reflecting this on 9/20/2022. What else are they needing?    Zuleika Holguin PA-C on 10/10/2022 at 7:43 AM

## 2022-10-11 NOTE — TELEPHONE ENCOUNTER
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 This letter is for the [facility] and it needs to state that he is unable to work to due back and neck. Last day of work was 9/6/2022.  Lalita Estrella LPN ....................  10/11/2022   10:52 AM

## 2022-10-11 NOTE — TELEPHONE ENCOUNTER
Spoke with [facility] and she states the disability needs to go through Dr. Casanova. Transferred to scheduling to make appointment.  Lalita Estrella LPN ....................  10/11/2022   12:57 PM

## 2022-10-11 NOTE — TELEPHONE ENCOUNTER
I need more information to be able to write the letter. Why does the county need a letter? Is this just outlining his inability to work or is this for disability?     Zuleika Holguin PA-C on 10/11/2022 at 11:35 AM

## 2022-10-18 ENCOUNTER — OFFICE VISIT (OUTPATIENT)
Dept: FAMILY MEDICINE | Facility: OTHER | Age: 42
End: 2022-10-18
Attending: CHIROPRACTOR
Payer: COMMERCIAL

## 2022-10-18 VITALS
DIASTOLIC BLOOD PRESSURE: 92 MMHG | HEART RATE: 86 BPM | HEIGHT: 70 IN | SYSTOLIC BLOOD PRESSURE: 146 MMHG | TEMPERATURE: 98.1 F | OXYGEN SATURATION: 97 % | WEIGHT: 263 LBS | BODY MASS INDEX: 37.65 KG/M2 | RESPIRATION RATE: 20 BRPM

## 2022-10-18 DIAGNOSIS — Z53.9 ERRONEOUS ENCOUNTER--DISREGARD: Primary | ICD-10-CM

## 2022-10-18 ASSESSMENT — PATIENT HEALTH QUESTIONNAIRE - PHQ9
10. IF YOU CHECKED OFF ANY PROBLEMS, HOW DIFFICULT HAVE THESE PROBLEMS MADE IT FOR YOU TO DO YOUR WORK, TAKE CARE OF THINGS AT HOME, OR GET ALONG WITH OTHER PEOPLE: NOT DIFFICULT AT ALL
SUM OF ALL RESPONSES TO PHQ QUESTIONS 1-9: 2
SUM OF ALL RESPONSES TO PHQ QUESTIONS 1-9: 2

## 2022-10-18 ASSESSMENT — PAIN SCALES - GENERAL: PAINLEVEL: MODERATE PAIN (5)

## 2022-10-18 NOTE — NURSING NOTE
"Chief Complaint   Patient presents with     Letter Request     Needs letter for the [facility] stating he can't work due to neck and back pain     His work medically retired him 9/1/22 due to his neck and back pain. They are redoing child support and [facility] needs a letter.      Initial BP (!) 146/92   Pulse 86   Temp 98.1  F (36.7  C) (Tympanic)   Resp 20   Ht 1.778 m (5' 10\")   Wt 119.3 kg (263 lb)   SpO2 97%   BMI 37.74 kg/m   Estimated body mass index is 37.74 kg/m  as calculated from the following:    Height as of this encounter: 1.778 m (5' 10\").    Weight as of this encounter: 119.3 kg (263 lb).         Norma J. Gosselin, LPN   "

## 2022-10-18 NOTE — LETTER
My Depression Action Plan  Name: Greg Mckenna   Date of Birth 1980  Date: 10/18/2022    My doctor: Zuleika Holguin   My clinic: [facility]  [address]  [place] [id number] [phone]          GREEN    ZONE   Good Control    What it looks like:     Things are going generally well. You have normal ups and downs. You may even feel depressed from time to time, but bad moods usually last less than a day.   What you need to do:  1. Continue to care for yourself (see self care plan)  2. Check your depression survival kit and update it as needed  3. Follow your physician s recommendations including any medication.  4. Do not stop taking medication unless you consult with your physician first.           YELLOW         ZONE Getting Worse    What it looks like:     Depression is starting to interfere with your life.     It may be hard to get out of bed; you may be starting to isolate yourself from others.    Symptoms of depression are starting to last most all day and this has happened for several days.     You may have suicidal thoughts but they are not constant.   What you need to do:     1. Call your care team. Your response to treatment will improve if you keep your care team informed of your progress. Yellow periods are signs an adjustment may need to be made.     2. Continue your self-care.  Just get dressed and ready for the day.  Don't give yourself time to talk yourself out of it.    3. Talk to someone in your support network.    4. Open up your Depression Self-Care Plan/Wellness Kit.           RED    ZONE Medical Alert - Get Help    What it looks like:     Depression is seriously interfering with your life.     You may experience these or other symptoms: You can t get out of bed most days, can t work or engage in other necessary activities, you have trouble taking care of basic hygiene, or basic responsibilities, thoughts of suicide or death that will not  go away, self-injurious behavior.     What you need to do:  1. Call your care team and request a same-day appointment. If they are not available (weekends or after hours) call your local crisis line, emergency room or 911.          Depression Self-Care Plan / Wellness Kit    Many people find that medication and therapy are helpful treatments for managing depression. In addition, making small changes to your everyday life can help to boost your mood and improve your wellbeing. Below are some tips for you to consider. Be sure to talk with your medical provider and/or behavioral health consultant if your symptoms are worsening or not improving.     Sleep   Sleep hygiene  means all of the habits that support good, restful sleep. It includes maintaining a consistent bedtime and wake time, using your bedroom only for sleeping or sex, and keeping the bedroom dark and free of distractions like a computer, smartphone, or television.     Develop a Healthy Routine  Maintain good hygiene. Get out of bed in the morning, make your bed, brush your teeth, take a shower, and get dressed. Don t spend too much time viewing media that makes you feel stressed. Find time to relax each day.    Exercise  Get some form of exercise every day. This will help reduce pain and release endorphins, the  feel good  chemicals in your brain. It can be as simple as just going for a walk or doing some gardening, anything that will get you moving.      Diet  Strive to eat healthy foods, including fruits and vegetables. Drink plenty of water. Avoid excessive sugar, caffeine, alcohol, and other mood-altering substances.     Stay Connected with Others  Stay in touch with friends and family members.    Manage Your Mood  Try deep breathing, massage therapy, biofeedback, or meditation. Take part in fun activities when you can. Try to find something to smile about each day.     Psychotherapy  Be open to working with a therapist if your provider recommends it.      Medication  Be sure to take your medication as prescribed. Most anti-depressants need to be taken every day. It usually takes several weeks for medications to work. Not all medicines work for all people. It is important to follow-up with your provider to make sure you have a treatment plan that is working for you. Do not stop your medication abruptly without first discussing it with your provider.    Crisis Resources   These hotlines are for both adults and children. They and are open 24 hours a day, 7 days a week unless noted otherwise.      National Suicide Prevention Lifeline   988 or 9-580-364-BXHX (4093)      Crisis Text Line    www.crisistextline.org  Text HOME to 873614 from anywhere in the [place], anytime, about any type of crisis. A live, trained crisis counselor will receive the text and respond quickly.      Man Lifeline for LGBTQ Youth  A national crisis intervention and suicide lifeline for LGBTQ youth under 25. Provides a safe place to talk without judgement. Call 1-515.678.8045; text START to 077480 or visit www.thetrevorproject.org to talk to a trained counselor.      For [facility] crisis numbers, visit the [facility] website at:  https://[place].gov/dhs/people-we-serve/adults/health-care/mental-health/resources/crisis-contacts.jsp

## 2022-11-23 ENCOUNTER — HOSPITAL ENCOUNTER (OUTPATIENT)
Dept: GENERAL RADIOLOGY | Facility: OTHER | Age: 42
Discharge: HOME | End: 2022-11-23
Attending: FAMILY MEDICINE
Payer: COMMERCIAL

## 2022-11-23 ENCOUNTER — TELEPHONE (OUTPATIENT)
Dept: FAMILY MEDICINE | Facility: OTHER | Age: 42
End: 2022-11-23

## 2022-11-23 ENCOUNTER — OFFICE VISIT (OUTPATIENT)
Dept: FAMILY MEDICINE | Facility: OTHER | Age: 42
End: 2022-11-23
Attending: FAMILY MEDICINE
Payer: COMMERCIAL

## 2022-11-23 VITALS
SYSTOLIC BLOOD PRESSURE: 130 MMHG | TEMPERATURE: 98.1 F | DIASTOLIC BLOOD PRESSURE: 80 MMHG | OXYGEN SATURATION: 98 % | WEIGHT: 252.9 LBS | BODY MASS INDEX: 36.29 KG/M2 | RESPIRATION RATE: 22 BRPM

## 2022-11-23 DIAGNOSIS — M75.42 SUBACROMIAL IMPINGEMENT OF LEFT SHOULDER: ICD-10-CM

## 2022-11-23 DIAGNOSIS — M48.02 CERVICAL SPINAL STENOSIS: Primary | ICD-10-CM

## 2022-11-23 DIAGNOSIS — E66.812 OBESITY, CLASS II, BMI 35-39.9: ICD-10-CM

## 2022-11-23 DIAGNOSIS — R29.3 POOR POSTURE: ICD-10-CM

## 2022-11-23 DIAGNOSIS — M54.12 CERVICAL RADICULOPATHY: ICD-10-CM

## 2022-11-23 PROCEDURE — 99214 OFFICE O/P EST MOD 30 MIN: CPT | Performed by: FAMILY MEDICINE

## 2022-11-23 PROCEDURE — G0463 HOSPITAL OUTPT CLINIC VISIT: HCPCS | Mod: 25

## 2022-11-23 PROCEDURE — G0463 HOSPITAL OUTPT CLINIC VISIT: HCPCS

## 2022-11-23 PROCEDURE — 73030 X-RAY EXAM OF SHOULDER: CPT | Mod: LT

## 2022-11-23 RX ORDER — AMLODIPINE BESYLATE 10 MG/1
1 TABLET ORAL DAILY
COMMUNITY
Start: 2022-11-17 | End: 2023-12-07

## 2022-11-23 RX ORDER — LISINOPRIL 20 MG/1
1 TABLET ORAL DAILY
COMMUNITY
Start: 2022-11-17 | End: 2023-12-07

## 2022-11-23 ASSESSMENT — PAIN SCALES - GENERAL: PAINLEVEL: MILD PAIN (3)

## 2022-11-23 NOTE — TELEPHONE ENCOUNTER
He could be noticing side effects of the lisinopril or amlodipine that was started through an outside provider or could still be experiencing fatigue related to hypertension or hypotension. I would recommend closely monitoring BP at home over the next 1-2 weeks and following up in clinic at that time for repeat evaluation.

## 2022-11-23 NOTE — NURSING NOTE
"Chief Complaint   Patient presents with     Shoulder Pain     Left shoulder pain for the last 2-weeks, progressively getting worse. ED visit in [place]       Initial /80 (BP Location: Right arm, Patient Position: Sitting, Cuff Size: Adult Large)   Temp 98.1  F (36.7  C) (Tympanic)   Resp 22   Wt 114.7 kg (252 lb 14.4 oz)   SpO2 98%   BMI 36.29 kg/m   Estimated body mass index is 36.29 kg/m  as calculated from the following:    Height as of 10/18/22: 1.778 m (5' 10\").    Weight as of this encounter: 114.7 kg (252 lb 14.4 oz).      Medication Reconciliation: complete    FOOD SECURITY SCREENING QUESTIONS:      The next two questions are to help us understand your food security.  If you are feeling you need any assistance in this area, we have resources available to support you today.    Hunger Vital Signs:  Within the past 12 months we worried whether our food would run out before we got money to buy more. Never  Within the past 12 months the food we bought just didn't last and we didn't have money to get more. Never    Orin Hayes LPN....................  11/23/2022   1:43 PM    "

## 2022-11-23 NOTE — TELEPHONE ENCOUNTER
Chief Complaint   Patient presents with     Hypertension     Patient presented to Clinic for a different issue and stated that he has recently been put on BP medication 11/17/22. Patient is complaining of being tired and would like to be seen or advised if symptoms are to be expected. Please call at your earliest convenience.       BP was 160/90 at initial visit and rechecked 10 minutes later and was 130/82.      Orin Hayes LPN....................  11/23/2022   2:30 PM

## 2022-11-23 NOTE — PROGRESS NOTES
Sports Medicine Office Note    HPI:  42-year-old male coming in for evaluation of left shoulder pain.  His pain has been particularly bothersome for the last couple weeks.  No inciting event or injury.  He rates his pain a 4/10.  He characterized the pain as sharp and stabbing.  He has difficulty with lifting and sitting.  He has tried heat and ice.  He has known severe spinal stenosis at C6-C7.  He is currently on permanent medical disability for this in addition to similar pathology in the lumbar spine.  He has tightness of his neck muscles and numbness/tingling extending down the right upper extremity.  He was recently evaluated at an outside facility, desiring a subacromial injection.  This was not provided as his symptoms were thought to be more due to cervical pathology as opposed to coming from the shoulder region.  He has been evaluated by a spine surgeon and surgery has been recommended.  He is holding off at this time.  He previously underwent physical therapy and reports minimal benefit.      EXAM:  /80 (BP Location: Right arm, Patient Position: Sitting, Cuff Size: Adult Large)   Temp 98.1  F (36.7  C) (Tympanic)   Resp 22   Wt 114.7 kg (252 lb 14.4 oz)   SpO2 98%   BMI 36.29 kg/m    MUSCULOSKELETAL EXAM:  LEFT SHOULDER  Inspection:  -No gross deformity  -No bruising or swelling  -Scars:  None    Tenderness to palpation of the:  -SC joint:  Negative  -AC joint:  Negative  -Clavicle:  Negative  -Biceps tendon in bicipital groove:  Negative  -Deltoid musculature:  Negative  -Upper trapezius musculature:  Negative    Range of Motion:  -Active flexion:  110 left (150 passively), 130 right  -Active abduction:  100 left (150 passively), 130 right    Strength:  -Supraspinatus:  4/5  -Infraspinatus:  4/5  -Subscapularis:  5/5  -Deltoid:  5/5    Special Tests:  -Randy test: Positive  -Martinez test: Positive  -Neer test: Positive  -Mid-arc pain: Present  -Crossarm adduction test: Positive  -Lag sign:   Negative    Other:  -Intact sensation to light touch distally.  -No signs of cyanosis. Normal skin temperature of the upper extremity.  -Elbow:  No gross deformity. Full range of motion.  -Hand/wrist:  No gross deformity. Full range of motion.  -Right shoulder:  No gross deformity. No palpable tenderness. Normal strength.    MUSCULOSKELETAL EXAM:  CERVICAL SPINE  Inspection:  -No gross deformity  -No bruising or swelling  -Scars:  None    Tenderness to palpation of the:  -Spinous processes: Positive  -Paracervical musculature: Positive  -Upper trapezius musculature: Positive    Sensation:  -Intact sensation to light touch in all dermatomes of the bilateral upper extremities    Reflexes:  -C5 (biceps): Symmetric bilaterally  -C6 (brachioradialis): Symmetric bilaterally  -C7 (triceps): Symmetric bilaterally    Other:  -No signs of cyanosis. Normal skin temperature of the upper extremities.      IMAGIN2022: 3 view left shoulder x-ray  - No fracture, dislocation, or bony lesion      ASSESSMENT/PLAN:  Diagnoses and all orders for this visit:  Cervical spinal stenosis  -     XR Shoulder Left G/E 3 Views  Cervical radiculopathy  Subacromial impingement of left shoulder  Poor posture  Obesity, Class II, BMI 35-39.9    42-year-old male with diffuse shoulder pain with known cervical pathology.  I do feel that the majority of his symptoms are coming from the cervical region.  Due to compensation and poor functional mechanics about the musculature of the shoulder, he likely does have some underlying subacromial impingement.  I do feel that addressing the cervical pathology, in addition to addressing posture and biomechanics will be most beneficial to alleviating the symptoms.  Given the extent of his cervical spinal stenosis, he may not achieve complete resolution of symptoms without surgical intervention.  X-rays of the left shoulder were performed in the office today and personally reviewed by me with findings as  demonstrated above by my interpretation.  We discussed treatment options to include home exercises, physical therapy, topical medications, oral medications, and injections.  I feel that injection may be slightly beneficial but will not relieve anything more than a mild portion of his pain.  The patient expresses understanding of this.  - Continue to work on addressing cervical pathology  - OTC analgesics as needed  - Encourage patient to consider reestablishing with PT  - Handout given for home exercises for subacromial bursitis  - Weight loss and improving posture would likely help with symptoms  - Follow-up as needed      Kris Terrell MD  11/23/2022  1:39 PM    Total time spent with this patient was 38 minutes which included chart review, visualization and independent interpretation of images, time spent with the patient, and documentation.    Procedure time:  0 minute(s)

## 2022-12-26 NOTE — PROGRESS NOTES
Assessment & Plan     1. Cervical stenosis of spinal canal  2. Protrusion of lumbar intervertebral disc  Onset history of severe cervical stenosis at C6-C7 with cervical retinopathy as well as lumbar disc protrusion at T12-L1, degenerative changes throughout lumbar spine with lumbar radiculopathy.  Detailed history as outlined below.  Working with  through [employer] who is requesting an outline of his medical history be faxed for review.  This was completed and faxed to [phone] for review.  Patient has not yet had a functional assessment in the process of obtaining Social Security disability.  He may need to have this completed prior to pursuing additional benefits.  Consider following up with spine specialist for management going forward as surgical options were discussed in 2021 but not pursued.      Return if symptoms worsen or fail to improve.    Zuleika Holguin PA-C  [facility] is a 42 year old, presenting for the following health issues:  Forms      History of Present Illness       Back Pain:  He presents for follow up of back pain. Patient's back pain is a recurring problem.  Location of back pain:  Right lower back, left lower back, left upper back and left side of neck  Description of back pain: shooting and stabbing  Back pain spreads: left side of neck    Since patient first noticed back pain, pain is: gradually worsening  Does back pain interfere with his job:  Yes      He eats 0-1 servings of fruits and vegetables daily.He consumes 2 sweetened beverage(s) daily.He exercises with enough effort to increase his heart rate 9 or less minutes per day.  He exercises with enough effort to increase his heart rate 3 or less days per week.   He is taking medications regularly.     Patient is here for longstanding history of cervical and lumbar pain.  Has been evaluated and treated for this for the last 2-1/2 years.  He has been  working towards obtaining Social Security disability due to his limitations with performing his previous job duties.  He has not yet had a functional assessment completed to determine if he could obtain gainful clement elsewhere through a different position to [employer].  He is working with a  through [facility] and they are requesting a letter outlining his medical history.  In brief summary he was initially evaluated in April 2020 where x-rays were completed showing degenerative changes in neck and low back.  Subsequently followed up in early 2021 where cervical MRI showed severe spinal stenosis at C6/C7 and lumbar MRI showed disc protrusion with annular tear at T12-L1 along with hypertrophic degenerative changes throughout rest of the spine.  Underwent physical therapy, interlaminal epidural steroid injection with minimal improvement.  Met with [facility] spine specialist in February 2021 who discussed surgical options but this was not pursued.  As of September 2021 patient has been pursuing medical disability due to his limitations with bending, lifting, turning, standing or sitting for prolonged period of time exacerbating his symptoms.        PAST MEDICAL HISTORY:   Past Medical History:   Diagnosis Date     Chronic sinusitis     No Comments Provided     Epididymitis     No Comments Provided     Personal history of other diseases of the female genital tract     Premature, born @ 23 weeks       PAST SURGICAL HISTORY:   Past Surgical History:   Procedure Laterality Date     ADENOIDECTOMY      x2     OTHER SURGICAL HISTORY      [id number],OTHER,x2     OTHER SURGICAL HISTORY      [id number],OTHER     TONSILLECTOMY      No Comments Provided       FAMILY HISTORY:   Family History   Problem Relation Age of Onset     Hypertension Father         Hypertension     Other - See Comments Father         hypercholesterolemia/back problems     Other - See Comments Mother         MS      "Diabetes Other         Diabetes,FH       SOCIAL HISTORY:   Social History     Tobacco Use     Smoking status: Never     Smokeless tobacco: Never   Substance Use Topics     Alcohol use: Yes     Alcohol/week: 14.0 standard drinks     Types: 14 Cans of beer per week     Comment: 2 beers a day      No Known Allergies  Current Outpatient Medications   Medication     albuterol (PROAIR HFA/PROVENTIL HFA/VENTOLIN HFA) 108 (90 Base) MCG/ACT inhaler     amLODIPine (NORVASC) 10 MG tablet     lisinopril (ZESTRIL) 20 MG tablet     No current facility-administered medications for this visit.         Review of Systems   Per HPI        Objective    /82   Pulse 88   Temp 96.9  F (36.1  C)   Resp 14   Ht 1.778 m (5' 10\")   Wt 117 kg (258 lb)   SpO2 97%   BMI 37.02 kg/m    Body mass index is 37.02 kg/m .  Physical Exam   General: Pleasant, in no apparent distress.  Musculoskeletal: Not repeated today  Psych: Appropriate mood and affect.      "

## 2022-12-27 ENCOUNTER — OFFICE VISIT (OUTPATIENT)
Dept: FAMILY MEDICINE | Facility: OTHER | Age: 42
End: 2022-12-27
Attending: PHYSICIAN ASSISTANT
Payer: COMMERCIAL

## 2022-12-27 VITALS
DIASTOLIC BLOOD PRESSURE: 82 MMHG | HEIGHT: 70 IN | OXYGEN SATURATION: 97 % | TEMPERATURE: 96.9 F | SYSTOLIC BLOOD PRESSURE: 136 MMHG | WEIGHT: 258 LBS | HEART RATE: 88 BPM | BODY MASS INDEX: 36.94 KG/M2 | RESPIRATION RATE: 14 BRPM

## 2022-12-27 DIAGNOSIS — M48.02 CERVICAL STENOSIS OF SPINAL CANAL: Primary | ICD-10-CM

## 2022-12-27 DIAGNOSIS — M51.26 PROTRUSION OF LUMBAR INTERVERTEBRAL DISC: ICD-10-CM

## 2022-12-27 PROCEDURE — G0463 HOSPITAL OUTPT CLINIC VISIT: HCPCS

## 2022-12-27 PROCEDURE — 99213 OFFICE O/P EST LOW 20 MIN: CPT | Performed by: PHYSICIAN ASSISTANT

## 2022-12-27 ASSESSMENT — PAIN SCALES - GENERAL: PAINLEVEL: MODERATE PAIN (4)

## 2022-12-27 NOTE — NURSING NOTE
Patient presents to clinic to have forms completed.  Lalita Estrella LPN ....................  12/27/2022   12:35 PM

## 2022-12-27 NOTE — LETTER
[facility]  [address]  [place] [address]  [phone]  Dept: [phone]      December 27, 2022      Patient: Greg Mckenna   YOB: 1980   Date of Visit: 12/27/2022       To Whom It May Concern:    Greg Mckenna has a known longstanding history of severe cervical stenosis with associated cervical radiculopathy as well as lumbar disc protrusion with lumbar radiculopathy. The following is a brief description of his medical care regarding these diagnoses.    Greg was initially evaluated for acute midline low back pain by primary care provider on 4/7/2020 where cervical and lumbar x-rays were completed showing diffuse degenerative changes. Conservative management was recommended at that time. He was subsequently evaluated in the clinic on 2/2/2021 where he had noticed continued cervical and lumbar symptoms with progression. Cervical MRI was completed 2/3/2021 showing severe spinal stenosis at C6-C7. Lumbar MRI was completed on 02/03/2021 showing disc protrusion with annual tear at T12-L1 and diffuse lumbar spine hypertrophic changes. Subsequently met with [facility] on 2/25/2021 where surgical options were discussed but not pursued. Patient also completed physical therapy starting in March of 2021. For additional management Greg underwent interlaminal epidural steroid injection at C7-T1 on 4/9/2021 with mild improvement in symptoms. Patient had been working through the [employer] and due to his significant cervical and lumbar pains he had been unable to continue with bending, turning, lifting, and sitting and standing for prolonged periods of time which was required of his position. Greg had presented to the [facility] in September of 2021 requesting medical records to submit along with paperwork to the [employer] to pursue medical disability and early MCC due to the above medical history.   Greg did not follow up in the clinic again until September of 2022 requesting updated paperwork for the [employer] again outlining his difficulties with performing his previous job duties including restricted bending, turning, lifting, and sitting and standing for prolonged periods of time. Most recently he was seen by sports medicine through [facility] on 11/23/2022 where it was felt his cervical radiculopathy symptoms were affecting his left shoulder and continued conservative management was recommended. He has not had additional follow up with spine specialist through [facility] or elsewhere regarding additional treatment options since initial appointment in February of 2021. He has not yet had a functional assessment completed to determine his functional limitations regarding gainful employment.     If you are needing additional information or have questions please feel free to contact me at [phone].     Sincerely,      Zuleika Holguin PA-C on 12/27/2022 at 3:07 PM

## 2022-12-27 NOTE — LETTER
[facility]  [address]  [place] [address]  [phone]  Dept: [phone]      December 27, 2022      Patient: Greg Mckenna   YOB: 1980   Date of Visit: 12/27/2022       To Whom It May Concern:    Greg Clarisa has a longstanding history of            Sincerely,

## 2023-03-06 ENCOUNTER — OFFICE VISIT (OUTPATIENT)
Dept: FAMILY MEDICINE | Facility: OTHER | Age: 43
End: 2023-03-06
Attending: PHYSICIAN ASSISTANT
Payer: COMMERCIAL

## 2023-03-06 VITALS
HEART RATE: 98 BPM | OXYGEN SATURATION: 96 % | SYSTOLIC BLOOD PRESSURE: 104 MMHG | DIASTOLIC BLOOD PRESSURE: 68 MMHG | RESPIRATION RATE: 24 BRPM | WEIGHT: 261.4 LBS | BODY MASS INDEX: 37.51 KG/M2 | TEMPERATURE: 97.4 F

## 2023-03-06 DIAGNOSIS — R07.9 CHEST PAIN, UNSPECIFIED TYPE: Primary | ICD-10-CM

## 2023-03-06 DIAGNOSIS — R06.02 SHORTNESS OF BREATH: ICD-10-CM

## 2023-03-06 DIAGNOSIS — K21.9 GASTROESOPHAGEAL REFLUX DISEASE, UNSPECIFIED WHETHER ESOPHAGITIS PRESENT: ICD-10-CM

## 2023-03-06 LAB
ALBUMIN SERPL BCG-MCNC: 4.2 G/DL (ref 3.5–5.2)
ALP SERPL-CCNC: 89 U/L (ref 40–129)
ALT SERPL W P-5'-P-CCNC: 36 U/L (ref 10–50)
ANION GAP SERPL CALCULATED.3IONS-SCNC: 7 MMOL/L (ref 7–15)
AST SERPL W P-5'-P-CCNC: 18 U/L (ref 10–50)
BASOPHILS # BLD AUTO: 0 10E3/UL (ref 0–0.2)
BASOPHILS NFR BLD AUTO: 0 %
BILIRUB SERPL-MCNC: 0.4 MG/DL
BUN SERPL-MCNC: 14.6 MG/DL (ref 6–20)
CALCIUM SERPL-MCNC: 9.3 MG/DL (ref 8.6–10)
CHLORIDE SERPL-SCNC: 105 MMOL/L (ref 98–107)
CREAT SERPL-MCNC: 1.12 MG/DL (ref 0.67–1.17)
D DIMER PPP FEU-MCNC: 0.48 UG/ML FEU (ref 0–0.5)
DEPRECATED HCO3 PLAS-SCNC: 29 MMOL/L (ref 22–29)
EOSINOPHIL # BLD AUTO: 0.3 10E3/UL (ref 0–0.7)
EOSINOPHIL NFR BLD AUTO: 3 %
ERYTHROCYTE [DISTWIDTH] IN BLOOD BY AUTOMATED COUNT: 12.7 % (ref 10–15)
GFR SERPL CREATININE-BSD FRML MDRD: 83 ML/MIN/1.73M2
GLUCOSE SERPL-MCNC: 102 MG/DL (ref 70–99)
HCT VFR BLD AUTO: 44.5 % (ref 40–53)
HGB BLD-MCNC: 14.9 G/DL (ref 13.3–17.7)
IMM GRANULOCYTES # BLD: 0 10E3/UL
IMM GRANULOCYTES NFR BLD: 1 %
LYMPHOCYTES # BLD AUTO: 2.1 10E3/UL (ref 0.8–5.3)
LYMPHOCYTES NFR BLD AUTO: 26 %
MCH RBC QN AUTO: 31.9 PG (ref 26.5–33)
MCHC RBC AUTO-ENTMCNC: 33.5 G/DL (ref 31.5–36.5)
MCV RBC AUTO: 95 FL (ref 78–100)
MONOCYTES # BLD AUTO: 0.6 10E3/UL (ref 0–1.3)
MONOCYTES NFR BLD AUTO: 8 %
NEUTROPHILS # BLD AUTO: 5 10E3/UL (ref 1.6–8.3)
NEUTROPHILS NFR BLD AUTO: 62 %
NRBC # BLD AUTO: 0 10E3/UL
NRBC BLD AUTO-RTO: 0 /100
PLATELET # BLD AUTO: 191 10E3/UL (ref 150–450)
POTASSIUM SERPL-SCNC: 5 MMOL/L (ref 3.4–5.3)
PROT SERPL-MCNC: 7.4 G/DL (ref 6.4–8.3)
RBC # BLD AUTO: 4.67 10E6/UL (ref 4.4–5.9)
SODIUM SERPL-SCNC: 141 MMOL/L (ref 136–145)
TROPONIN T SERPL HS-MCNC: 15 NG/L
TSH SERPL DL<=0.005 MIU/L-ACNC: 0.78 UIU/ML (ref 0.3–4.2)
WBC # BLD AUTO: 8 10E3/UL (ref 4–11)

## 2023-03-06 PROCEDURE — 36415 COLL VENOUS BLD VENIPUNCTURE: CPT | Mod: ZL | Performed by: PHYSICIAN ASSISTANT

## 2023-03-06 PROCEDURE — 84443 ASSAY THYROID STIM HORMONE: CPT | Mod: ZL | Performed by: PHYSICIAN ASSISTANT

## 2023-03-06 PROCEDURE — 80053 COMPREHEN METABOLIC PANEL: CPT | Mod: ZL | Performed by: PHYSICIAN ASSISTANT

## 2023-03-06 PROCEDURE — 84484 ASSAY OF TROPONIN QUANT: CPT | Mod: ZL | Performed by: PHYSICIAN ASSISTANT

## 2023-03-06 PROCEDURE — 85004 AUTOMATED DIFF WBC COUNT: CPT | Mod: ZL | Performed by: PHYSICIAN ASSISTANT

## 2023-03-06 PROCEDURE — 85379 FIBRIN DEGRADATION QUANT: CPT | Mod: ZL | Performed by: PHYSICIAN ASSISTANT

## 2023-03-06 PROCEDURE — 93000 ELECTROCARDIOGRAM COMPLETE: CPT | Performed by: INTERNAL MEDICINE

## 2023-03-06 PROCEDURE — 99214 OFFICE O/P EST MOD 30 MIN: CPT | Performed by: PHYSICIAN ASSISTANT

## 2023-03-06 ASSESSMENT — ENCOUNTER SYMPTOMS
MYALGIAS: 0
PSYCHIATRIC NEGATIVE: 1
DIARRHEA: 0
ABDOMINAL PAIN: 0
NAUSEA: 0
DIZZINESS: 0
WHEEZING: 0
FEVER: 0
CHILLS: 0
COUGH: 0
CONSTIPATION: 0
PALPITATIONS: 1
SHORTNESS OF BREATH: 1
FATIGUE: 1
SORE THROAT: 0
VOMITING: 0

## 2023-03-06 ASSESSMENT — PATIENT HEALTH QUESTIONNAIRE - PHQ9
10. IF YOU CHECKED OFF ANY PROBLEMS, HOW DIFFICULT HAVE THESE PROBLEMS MADE IT FOR YOU TO DO YOUR WORK, TAKE CARE OF THINGS AT HOME, OR GET ALONG WITH OTHER PEOPLE: NOT DIFFICULT AT ALL
SUM OF ALL RESPONSES TO PHQ QUESTIONS 1-9: 4
SUM OF ALL RESPONSES TO PHQ QUESTIONS 1-9: 4

## 2023-03-06 ASSESSMENT — PAIN SCALES - GENERAL: PAINLEVEL: NO PAIN (0)

## 2023-03-06 NOTE — PATIENT INSTRUCTIONS
Started on omeprazole for possible heartburn.     Referred for a heart echocardiogram and cardiology consult.   Return to ER with change/worsening of symptoms.

## 2023-03-06 NOTE — NURSING NOTE
Pt presents to clinic today for SHORTNESS OF BREATH AND FATIGUE. Has been ongoing for over a week now.   Has been having increased nightmares as well.     FOOD SECURITY SCREENING QUESTIONS:    The next two questions are to help us understand your food security.  If you are feeling you need any assistance in this area, we have resources available to support you today.    Hunger Vital Signs:  Within the past 12 months we worried whether our food would run out before we got money to buy more. Never  Within the past 12 months the food we bought just didn't last and we didn't have money to get more. Never        Advance care directive on file? no  Medication Reconciliation: complete  Alyssa Hogue, CELENA,LPN on 3/6/2023 at 2:46 PM

## 2023-03-06 NOTE — PROGRESS NOTES
"  Assessment & Plan   Problem List Items Addressed This Visit    None  Visit Diagnoses     Chest pain, unspecified type    -  Primary    Relevant Orders    EKG 12-lead, tracing only (Completed)    CBC and Differential (Completed)    Troponin T, High Sensitivity (Completed)    D dimer quantitative (Completed)    TSH Reflex GH (Completed)    Comprehensive Metabolic Panel (Completed)    Echocardiogram Complete    Adult Cardiology Eval  Referral    Shortness of breath        Relevant Orders    EKG 12-lead, tracing only (Completed)    CBC and Differential (Completed)    Troponin T, High Sensitivity (Completed)    D dimer quantitative (Completed)    TSH Reflex GH (Completed)    Comprehensive Metabolic Panel (Completed)    Echocardiogram Complete    Adult Cardiology Eval [facility] Referral    Gastroesophageal reflux disease, unspecified whether esophagitis present        Relevant Medications    omeprazole (PRILOSEC) 20 MG DR capsule         Completed EKG which showed normal sinus rhythm.  Final read pending by internal medicine.  Completed a thorough lab work-up including CBC, CMP, TSH, D-dimer, and troponin.  Labs are stable.  No acute concerns at this time.  Patient was started omeprazole 20 mg daily for possible GERD symptoms.  Referred to cardiology and to have an echocardiogram completed to rule out concerns.  Gave warning signs and symptoms.  Return to clinic or emergency room if symptoms are changing or worsening.      Ordering of each unique test  Prescription drug management  30 minutes spent on the date of the encounter doing chart review, history and exam, documentation and further activities per the note       BMI:   Estimated body mass index is 37.51 kg/m  as calculated from the following:    Height as of 12/27/22: 1.778 m (5' 10\").    Weight as of this encounter: 118.6 kg (261 lb 6.4 oz).   Weight management plan: Discussed healthy diet and exercise guidelines    See Patient Instructions    Return if " symptoms worsen or fail to improve.    Ca Sneed PA-C  [facility]    Subjective   Greg is a 43 year old, presenting for the following health issues:  Fatigue and Shortness of Breath      Fatigue  Associated symptoms include chest pain and fatigue. Pertinent negatives include no abdominal pain, chills, coughing, fever, myalgias, nausea, rash, sore throat or vomiting.   Shortness of Breath  Associated symptoms include chest pain and fatigue. Pertinent negatives include no abdominal pain, chills, coughing, fever, myalgias, nausea, rash, sore throat or vomiting.   History of Present Illness       Reason for visit:  Heart feels wierd like cant breathe  Symptom onset:  3-7 days ago    He eats 0-1 servings of fruits and vegetables daily.He consumes 1 sweetened beverage(s) daily.He exercises with enough effort to increase his heart rate 10 to 19 minutes per day.  He exercises with enough effort to increase his heart rate 3 or less days per week.   He is taking medications regularly.    Today's PHQ-9         PHQ-9 Total Score: 4    PHQ-9 Q9 Thoughts of better off dead/self-harm past 2 weeks :   Not at all    How difficult have these problems made it for you to do your work, take care of things at home, or get along with other people: Not difficult at all       Fatigue and shortness of breath.     Patient has been struggling with having harder time catching his breath over the last week.  Feels like his lungs are working overtime.  Currently on a blood pressure medication.  States that he is having harder time sleeping.  Having nightmares.  States that he has  in his sleep 15-20 times in the past few weeks. Previously had elevated blood pressure.  It is now stable.  Was getting dizzy if he took both medications at the same time.  He now supports the medication into 2 different times which helps.  Feels tired and exhausted.  Blood pressure was around 120/80 at [facility].  Systolic blood pressure  usually runs around 120 at home.  Feels chest pain when he tries to lay down.  Feels a burning sensation.  Unsure if it is heartburn.  Feels short of breath at times.  Lasts about a minute.  Gets water which does not help.  Sometimes will last up to 5 minutes.  Wakes up with chest symptoms and shortness of breath.  Occasionally his heart will race.  No fevers or chills.  Had a heart murmur when he was first born at 23 weeks of gestational age.  No new foods or medications.  No recent plane trips.  Occasionally will feel pain radiating up to his left jaw especially with laying down.  No recent cough or cold symptoms.  No fevers or chills.  No urinary symptoms.  No bowel issues.      Review of Systems   Constitutional: Positive for fatigue. Negative for chills and fever.   HENT: Negative for ear pain and sore throat.    Respiratory: Positive for shortness of breath. Negative for cough and wheezing.    Cardiovascular: Positive for chest pain and palpitations.   Gastrointestinal: Negative for abdominal pain, constipation, diarrhea, nausea and vomiting.   Genitourinary: Negative.    Musculoskeletal: Negative for myalgias.   Skin: Negative for rash.   Neurological: Negative for dizziness.   Psychiatric/Behavioral: Negative.             Objective    /68 (BP Location: Right arm, Patient Position: Sitting, Cuff Size: Adult Large)   Pulse 98   Temp 97.4  F (36.3  C) (Tympanic)   Resp 24   Wt 118.6 kg (261 lb 6.4 oz)   SpO2 96%   BMI 37.51 kg/m    Body mass index is 37.51 kg/m .  Physical Exam  Vitals and nursing note reviewed.   Constitutional:       Appearance: Normal appearance.   HENT:      Head: Normocephalic and atraumatic.   Cardiovascular:      Rate and Rhythm: Normal rate and regular rhythm.      Heart sounds: Normal heart sounds.   Pulmonary:      Effort: Pulmonary effort is normal.      Breath sounds: Normal breath sounds.   Abdominal:      General: Abdomen is flat. Bowel sounds are normal. There is no  distension.      Palpations: Abdomen is soft. There is no mass.      Tenderness: There is no abdominal tenderness. There is no guarding or rebound.      Hernia: No hernia is present.   Musculoskeletal:         General: Normal range of motion.      Cervical back: Normal range of motion.      Comments: Minimal pain with palpation of the left upper chest.  No bruising or swelling appreciated.   Skin:     General: Skin is warm and dry.   Neurological:      General: No focal deficit present.      Mental Status: He is alert and oriented to person, place, and time.   Psychiatric:         Mood and Affect: Mood normal.         Behavior: Behavior normal.            Results for orders placed or performed in visit on 03/06/23   Troponin T, High Sensitivity     Status: Normal   Result Value Ref Range    Troponin T, High Sensitivity 15 <=22 ng/L   D dimer quantitative     Status: Normal   Result Value Ref Range    D-Dimer Quantitative 0.48 0.00 - 0.50 ug/mL FEU    Narrative    This D-dimer assay is intended for use in conjunction with a clinical pretest probability assessment model to exclude pulmonary embolism (PE) and deep venous thrombosis (DVT) in outpatients suspected of PE or DVT. The cut-off value is 0.50 ug/mL FEU.   TSH Reflex GH     Status: Normal   Result Value Ref Range    TSH 0.78 0.30 - 4.20 uIU/mL   Comprehensive Metabolic Panel     Status: Abnormal   Result Value Ref Range    Sodium 141 136 - 145 mmol/L    Potassium 5.0 3.4 - 5.3 mmol/L    Chloride 105 98 - 107 mmol/L    Carbon Dioxide (CO2) 29 22 - 29 mmol/L    Anion Gap 7 7 - 15 mmol/L    Urea Nitrogen 14.6 6.0 - 20.0 mg/dL    Creatinine 1.12 0.67 - 1.17 mg/dL    Calcium 9.3 8.6 - 10.0 mg/dL    Glucose 102 (H) 70 - 99 mg/dL    Alkaline Phosphatase 89 40 - 129 U/L    AST 18 10 - 50 U/L    ALT 36 10 - 50 U/L    Protein Total 7.4 6.4 - 8.3 g/dL    Albumin 4.2 3.5 - 5.2 g/dL    Bilirubin Total 0.4 <=1.2 mg/dL    GFR Estimate 83 >60 mL/min/1.73m2   CBC with platelets  and differential     Status: None   Result Value Ref Range    WBC Count 8.0 4.0 - 11.0 10e3/uL    RBC Count 4.67 4.40 - 5.90 10e6/uL    Hemoglobin 14.9 13.3 - 17.7 g/dL    Hematocrit 44.5 40.0 - 53.0 %    MCV 95 78 - 100 fL    MCH 31.9 26.5 - 33.0 pg    MCHC 33.5 31.5 - 36.5 g/dL    RDW 12.7 10.0 - 15.0 %    Platelet Count 191 150 - 450 10e3/uL    % Neutrophils 62 %    % Lymphocytes 26 %    % Monocytes 8 %    % Eosinophils 3 %    % Basophils 0 %    % Immature Granulocytes 1 %    NRBCs per 100 WBC 0 <1 /100    Absolute Neutrophils 5.0 1.6 - 8.3 10e3/uL    Absolute Lymphocytes 2.1 0.8 - 5.3 10e3/uL    Absolute Monocytes 0.6 0.0 - 1.3 10e3/uL    Absolute Eosinophils 0.3 0.0 - 0.7 10e3/uL    Absolute Basophils 0.0 0.0 - 0.2 10e3/uL    Absolute Immature Granulocytes 0.0 <=0.4 10e3/uL    Absolute NRBCs 0.0 10e3/uL   EKG 12-lead, tracing only     Status: None (Preliminary result)   Result Value Ref Range    Systolic Blood Pressure  mmHg    Diastolic Blood Pressure  mmHg    Ventricular Rate 89 BPM    Atrial Rate 89 BPM    CA Interval 146 ms    QRS Duration 90 ms     ms    QTc 430 ms    P Axis 35 degrees    R AXIS 47 degrees    T Axis 56 degrees    Interpretation ECG       Sinus rhythm  Normal ECG  No previous ECGs available     CBC and Differential     Status: None    Narrative    The following orders were created for panel order CBC and Differential.  Procedure                               Abnormality         Status                     ---------                               -----------         ------                     CBC with platelets and d...[[id number]]                      Final result                 Please view results for these tests on the individual orders.

## 2023-03-09 LAB
ATRIAL RATE - MUSE: 89 BPM
DIASTOLIC BLOOD PRESSURE - MUSE: NORMAL MMHG
INTERPRETATION ECG - MUSE: NORMAL
P AXIS - MUSE: 35 DEGREES
PR INTERVAL - MUSE: 146 MS
QRS DURATION - MUSE: 90 MS
QT - MUSE: 354 MS
QTC - MUSE: 430 MS
R AXIS - MUSE: 47 DEGREES
SYSTOLIC BLOOD PRESSURE - MUSE: NORMAL MMHG
T AXIS - MUSE: 56 DEGREES
VENTRICULAR RATE- MUSE: 89 BPM

## 2023-04-03 NOTE — PROGRESS NOTES
Assessment & Plan     1. Deviated septum  Previous diagnosis of deviated septum by ENT, no signs of worsening on exam. Patient requesting follow up ENT evaluation due to feeling his nasal ways are partially blocked, difficulties breathing through nose.   - Adult ENT  Referral; Future    2. Snoring  3. Witnessed apneic spells  Chronic, progressing. Referral for sleep study.   - Adult Sleep Eval & Management  Referral; Future      No follow-ups on file.    Zuleika Holguin PA-C  [facility]   Greg is a 43 year old, presenting for the following health issues:  Sinus Problem         View : No data to display.              History of Present Illness       Reason for visit:  Feels like something blocking behind nose hard to breathe or something with heart  Symptom onset:  1-2 weeks ago  Symptoms include:  Seems like something blocking sinus trouble breathing  Symptom intensity:  Moderate  Symptom progression:  Worsening  Had these symptoms before:  No  What makes it worse:  No  What makes it better:  No    He eats 0-1 servings of fruits and vegetables daily.He consumes 2 sweetened beverage(s) daily.He exercises with enough effort to increase his heart rate 10 to 19 minutes per day.  He exercises with enough effort to increase his heart rate 3 or less days per week.   He is taking medications regularly.    Today's PHQ-9         PHQ-9 Total Score: 7    PHQ-9 Q9 Thoughts of better off dead/self-harm past 2 weeks :   Not at all    How difficult have these problems made it for you to do your work, take care of things at home, or get along with other people: Not difficult at all     Here for discussion regarding sleep. Reports he has concerns with possible ELVIRA. Longstanding history of snoring that is progressing. Girlfriend reports witnessed apneic episodes. Does not sleep well, is quite tired throughout the day. Also feels he has difficulties breathing through his  nose, especially when sleeping. Reports he was evaluated by ENT in the past and diagnosed with a deviated septum. Feels this is worsening. Feels like something is stuck in his nose, blocking ability to breath clearly. Reports he is unable to blow his nose. No associated pain. Reports he is s/p tonsillectomy and adenoidectomy.     Scheduled for follow up echocardiogram with subsequent cardiology evaluation due to chest pain. Evaluated in the clinic for this 3/6/2023.     PAST MEDICAL HISTORY:   Past Medical History:   Diagnosis Date     Chronic sinusitis     No Comments Provided     Epididymitis     No Comments Provided     Personal history of other diseases of the female genital tract     Premature, born @ 23 weeks       PAST SURGICAL HISTORY:   Past Surgical History:   Procedure Laterality Date     ADENOIDECTOMY      x2     OTHER SURGICAL HISTORY      [id number],OTHER,x2     OTHER SURGICAL HISTORY      [id number],OTHER     TONSILLECTOMY      No Comments Provided       FAMILY HISTORY:   Family History   Problem Relation Age of Onset     Hypertension Father         Hypertension     Other - See Comments Father         hypercholesterolemia/back problems     Other - See Comments Mother         MS     Diabetes Other         Diabetes,FH       SOCIAL HISTORY:   Social History     Tobacco Use     Smoking status: Never     Smokeless tobacco: Never   Vaping Use     Vaping status: Never Used   Substance Use Topics     Alcohol use: Yes     Alcohol/week: 14.0 standard drinks of alcohol     Types: 14 Cans of beer per week     Comment: 2 beers a day      No Known Allergies    Current Outpatient Medications   Medication     albuterol (PROAIR HFA/PROVENTIL HFA/VENTOLIN HFA) 108 (90 Base) MCG/ACT inhaler     amLODIPine (NORVASC) 10 MG tablet     lisinopril (ZESTRIL) 20 MG tablet     omeprazole (PRILOSEC) 20 MG DR capsule     No current facility-administered medications for this visit.         Review of Systems   Per HPI        Objective   "  /78   Pulse 88   Temp (!) 96.7  F (35.9  C)   Resp 14   Ht 1.778 m (5' 10\")   Wt 120 kg (264 lb 9.6 oz)   SpO2 96%   BMI 37.97 kg/m    Body mass index is 37.97 kg/m .  Physical Exam   General: Pleasant, in no apparent distress.  Eyes: Sclera are white and conjunctiva are clear bilaterally. Lacrimal apparatus free of erythema, edema, and discharge bilaterally.  Nose: External nose is symmetrical and free of lesions and deformities. Mucosa is soft pink and without erythema, edema, bleeding, or exudate. Possible mild deviated septum.  Oropharynx: Oral mucosa is pink and without ulcers, nodules, and white patches. Tongue is symmetrical, pink, and without masses or lesions. Pharynx is pink, symmetrical, and without lesions. Uvula is midline. Tonsils are pink, symmetrical, and without edema, ulcers, or exudates, and 1+ bilaterally.  Neck: No cervical lymphadenopathy on inspection and palpation.  Cardiovascular: Regular rate and rhythm with S1 equal to S2. No murmurs, friction rubs, or gallops.   Respiratory: Lungs are resonant and clear to auscultation bilaterally. No wheezes, crackles, or rhonchi.  Psych: Appropriate mood and affect.            "

## 2023-04-04 ENCOUNTER — OFFICE VISIT (OUTPATIENT)
Dept: FAMILY MEDICINE | Facility: OTHER | Age: 43
End: 2023-04-04
Attending: PHYSICIAN ASSISTANT
Payer: COMMERCIAL

## 2023-04-04 VITALS
HEART RATE: 88 BPM | TEMPERATURE: 96.7 F | SYSTOLIC BLOOD PRESSURE: 132 MMHG | BODY MASS INDEX: 37.88 KG/M2 | RESPIRATION RATE: 14 BRPM | DIASTOLIC BLOOD PRESSURE: 78 MMHG | WEIGHT: 264.6 LBS | OXYGEN SATURATION: 96 % | HEIGHT: 70 IN

## 2023-04-04 DIAGNOSIS — R06.83 SNORING: ICD-10-CM

## 2023-04-04 DIAGNOSIS — R06.81 WITNESSED APNEIC SPELLS: ICD-10-CM

## 2023-04-04 DIAGNOSIS — J34.2 DEVIATED SEPTUM: Primary | ICD-10-CM

## 2023-04-04 PROCEDURE — 99214 OFFICE O/P EST MOD 30 MIN: CPT | Performed by: PHYSICIAN ASSISTANT

## 2023-04-04 ASSESSMENT — PAIN SCALES - GENERAL: PAINLEVEL: MILD PAIN (2)

## 2023-04-04 ASSESSMENT — PATIENT HEALTH QUESTIONNAIRE - PHQ9
SUM OF ALL RESPONSES TO PHQ QUESTIONS 1-9: 7
10. IF YOU CHECKED OFF ANY PROBLEMS, HOW DIFFICULT HAVE THESE PROBLEMS MADE IT FOR YOU TO DO YOUR WORK, TAKE CARE OF THINGS AT HOME, OR GET ALONG WITH OTHER PEOPLE: NOT DIFFICULT AT ALL
SUM OF ALL RESPONSES TO PHQ QUESTIONS 1-9: 7

## 2023-04-04 NOTE — NURSING NOTE
Patient presents to clinic with sinus pain and pressure that started a couple weeks ago.  Lalita Estrella LPN ....................  4/4/2023   3:32 PM

## 2023-04-05 ASSESSMENT — PATIENT HEALTH QUESTIONNAIRE - PHQ9
SUM OF ALL RESPONSES TO PHQ QUESTIONS 1-9: 7
10. IF YOU CHECKED OFF ANY PROBLEMS, HOW DIFFICULT HAVE THESE PROBLEMS MADE IT FOR YOU TO DO YOUR WORK, TAKE CARE OF THINGS AT HOME, OR GET ALONG WITH OTHER PEOPLE: NOT DIFFICULT AT ALL

## 2023-04-07 ENCOUNTER — HOSPITAL ENCOUNTER (OUTPATIENT)
Dept: CARDIOLOGY | Facility: OTHER | Age: 43
Discharge: HOME | End: 2023-04-07
Attending: PHYSICIAN ASSISTANT | Admitting: PHYSICIAN ASSISTANT
Payer: COMMERCIAL

## 2023-04-07 DIAGNOSIS — R07.9 CHEST PAIN, UNSPECIFIED TYPE: ICD-10-CM

## 2023-04-07 DIAGNOSIS — R06.02 SHORTNESS OF BREATH: ICD-10-CM

## 2023-04-07 LAB — LVEF ECHO: NORMAL

## 2023-04-07 PROCEDURE — 93306 TTE W/DOPPLER COMPLETE: CPT | Mod: 26 | Performed by: INTERNAL MEDICINE

## 2023-04-07 PROCEDURE — 93306 TTE W/DOPPLER COMPLETE: CPT

## 2023-04-11 ENCOUNTER — OFFICE VISIT (OUTPATIENT)
Dept: CARDIOLOGY | Facility: OTHER | Age: 43
End: 2023-04-11
Attending: NURSE PRACTITIONER
Payer: COMMERCIAL

## 2023-04-11 VITALS
BODY MASS INDEX: 37.74 KG/M2 | SYSTOLIC BLOOD PRESSURE: 124 MMHG | WEIGHT: 263 LBS | HEART RATE: 88 BPM | DIASTOLIC BLOOD PRESSURE: 82 MMHG | TEMPERATURE: 98 F | OXYGEN SATURATION: 95 % | RESPIRATION RATE: 18 BRPM

## 2023-04-11 DIAGNOSIS — R06.83 HABITUAL SNORING: ICD-10-CM

## 2023-04-11 DIAGNOSIS — R06.09 DYSPNEA ON EXERTION: ICD-10-CM

## 2023-04-11 DIAGNOSIS — E66.812 CLASS 2 OBESITY WITHOUT SERIOUS COMORBIDITY WITH BODY MASS INDEX (BMI) OF 37.0 TO 37.9 IN ADULT, UNSPECIFIED OBESITY TYPE: ICD-10-CM

## 2023-04-11 DIAGNOSIS — R53.83 MALAISE AND FATIGUE: ICD-10-CM

## 2023-04-11 DIAGNOSIS — F51.4 NIGHT TERRORS: ICD-10-CM

## 2023-04-11 DIAGNOSIS — R07.9 CHEST PAIN, UNSPECIFIED TYPE: Primary | ICD-10-CM

## 2023-04-11 DIAGNOSIS — Z13.1 SCREENING FOR DIABETES MELLITUS: ICD-10-CM

## 2023-04-11 DIAGNOSIS — R60.0 EDEMA OF BOTH LOWER LEGS: ICD-10-CM

## 2023-04-11 DIAGNOSIS — R53.81 MALAISE AND FATIGUE: ICD-10-CM

## 2023-04-11 DIAGNOSIS — I10 ESSENTIAL HYPERTENSION: ICD-10-CM

## 2023-04-11 DIAGNOSIS — E78.5 DYSLIPIDEMIA: ICD-10-CM

## 2023-04-11 PROBLEM — M48.02 SPINAL STENOSIS IN CERVICAL REGION: Status: ACTIVE | Noted: 2023-04-11

## 2023-04-11 PROBLEM — M48.07 SPINAL STENOSIS OF LUMBOSACRAL REGION: Status: ACTIVE | Noted: 2023-04-11

## 2023-04-11 LAB
ATRIAL RATE - MUSE: 85 BPM
CHOLEST SERPL-MCNC: 217 MG/DL
DIASTOLIC BLOOD PRESSURE - MUSE: NORMAL MMHG
HBA1C MFR BLD: 5.8 % (ref 4–6.2)
HDLC SERPL-MCNC: 34 MG/DL
HOLD SPECIMEN: NORMAL
INTERPRETATION ECG - MUSE: NORMAL
LDLC SERPL CALC-MCNC: 145 MG/DL
NONHDLC SERPL-MCNC: 183 MG/DL
NT-PROBNP SERPL-MCNC: 113 PG/ML (ref 0–450)
P AXIS - MUSE: 25 DEGREES
PR INTERVAL - MUSE: 148 MS
QRS DURATION - MUSE: 86 MS
QT - MUSE: 360 MS
QTC - MUSE: 428 MS
R AXIS - MUSE: 44 DEGREES
SYSTOLIC BLOOD PRESSURE - MUSE: NORMAL MMHG
T AXIS - MUSE: 53 DEGREES
TRIGL SERPL-MCNC: 188 MG/DL
VENTRICULAR RATE- MUSE: 85 BPM

## 2023-04-11 PROCEDURE — 36415 COLL VENOUS BLD VENIPUNCTURE: CPT | Mod: ZL | Performed by: NURSE PRACTITIONER

## 2023-04-11 PROCEDURE — 99205 OFFICE O/P NEW HI 60 MIN: CPT | Performed by: NURSE PRACTITIONER

## 2023-04-11 PROCEDURE — 83036 HEMOGLOBIN GLYCOSYLATED A1C: CPT | Mod: ZL | Performed by: NURSE PRACTITIONER

## 2023-04-11 PROCEDURE — 80061 LIPID PANEL: CPT | Mod: ZL | Performed by: NURSE PRACTITIONER

## 2023-04-11 PROCEDURE — 83880 ASSAY OF NATRIURETIC PEPTIDE: CPT | Mod: ZL | Performed by: NURSE PRACTITIONER

## 2023-04-11 PROCEDURE — 93000 ELECTROCARDIOGRAM COMPLETE: CPT | Performed by: INTERNAL MEDICINE

## 2023-04-11 ASSESSMENT — PAIN SCALES - GENERAL: PAINLEVEL: MILD PAIN (2)

## 2023-04-11 NOTE — PATIENT INSTRUCTIONS
You will receive a phone call to schedule stress test.   Referral for sleep study, consult with Sleep Medicine physician.   Labs today, we will call you with results.

## 2023-04-11 NOTE — PROGRESS NOTES
"[facility] HEART CARE   CARDIOLOGY CONSULT     Greg Mckenna   [address] [facility] [address]    Zuleika Holguin     Chief Complaint   Patient presents with     Consult     Chest pain, SOB        HPI:   Mr. Mckenna is a 43 year old male who presents for cardiology evaluation with recent reports of chest pain. Patient has a history of obesity, peptic ulcer disease, bursitis, HTN (currently on Amlodipine 10 mg dialy and Lisinopril 20 mg daily- started 11/2022), depression and severe spinal stenosis . He has never used tobacco, no history of DM despite family history significant for DM.      Patient was evaluated by primary care clinic on 3/6/2023 with recent reports of chest pain.  ECG without ischemic changes, sinus rhythm.  Lab work-up was largely unremarkable.  He was started on omeprazole 20 mg daily for possible GERD symptoms.  He was referred for echocardiogram which has been reviewed and structurally normal, preserved LV systolic function, no regional wall motion abnormalities.    He was evaluated in the ED on 11/5/2022 for hypertensive urgency. Admits that he was very stressed at this time. ECG was noted to be normal at that time. He was started on Lisinopril and Amlodipine. No issues since that time.     Today, patient reports symptom onset about two weeks ago. Symptoms of excessive fatigue, energy loss, racing heart,  \"little bit\" of chest pressure- localizes to left chest without radiation down arm, neck or back. Describes \"heart working harder and totally exhausted\". No increased dyspnea from baseline noted. Admits to additional symptoms of difficulty sleeping, horrifying dreams/night terrors- wakes with racing heart. Describes severe snoring, he is concerned likely sleep apnea. No lightheadedness or syncope. No edema.     RELEVANT TESTING REVIEWED:  Echocardiogram 4/7/2023  Interpretation Summary  Technically difficult study.Extremely poor acoustic windows.  Global and regional left ventricular function " is normal with an EF of 55-60%.  Right ventricular function, chamber size, wall motion, and thickness are  normal.  Pulmonary artery systolic pressure cannot be assessed.  The inferior vena cava is normal.  No pericardial effusion is present.  There is no prior study for direct comparison.    ECG 3/6/2023  Normal sinus rhythm, no ST-T changes.    PAST MEDICAL HISTORY:   Past Medical History:   Diagnosis Date     Chronic sinusitis     No Comments Provided     Epididymitis     No Comments Provided     Personal history of other diseases of the female genital tract     Premature, born @ 23 weeks          FAMILY HISTORY:   Family History   Problem Relation Age of Onset     Hypertension Father         Hypertension     Other - See Comments Father         hypercholesterolemia/back problems     Other - See Comments Mother         MS     Diabetes Other         Diabetes,FH          PAST SURGICAL HISTORY:   Past Surgical History:   Procedure Laterality Date     ADENOIDECTOMY      x2     OTHER SURGICAL HISTORY      475482,OTHER,x2     OTHER SURGICAL HISTORY      879823,OTHER     TONSILLECTOMY      No Comments Provided          SOCIAL HISTORY:   Social History     Socioeconomic History     Marital status:    Tobacco Use     Smoking status: Never     Smokeless tobacco: Never   Vaping Use     Vaping status: Never Used   Substance and Sexual Activity     Alcohol use: Yes     Alcohol/week: 14.0 standard drinks of alcohol     Types: 14 Cans of beer per week     Comment: 2 beers a day     Drug use: Never     Comment: Topical CBD creme     Sexual activity: Yes     Partners: Female     Birth control/protection: None   Social History Narrative    ,  with two children.   Works as a .          CURRENT MEDICATIONS:   Prior to Admission medications    Medication Sig Start Date End Date Taking? Authorizing Provider   albuterol (PROAIR HFA/PROVENTIL HFA/VENTOLIN HFA) 108 (90 Base) MCG/ACT inhaler Inhale 2 puffs into  the lungs every 4 hours as needed for shortness of breath / dyspnea or wheezing 9/13/21   Mona Roman, NP   amLODIPine (NORVASC) 10 MG tablet Take 1 tablet by mouth daily 11/17/22   Reported, Patient   lisinopril (ZESTRIL) 20 MG tablet Take 1 tablet by mouth daily 11/17/22   Reported, Patient   omeprazole (PRILOSEC) 20 MG DR capsule Take 1 capsule (20 mg) by mouth daily 3/6/23   Ca Sneed PA-C          ALLERGIES:   No Known Allergies     ROS:   CONSTITUTIONAL: No reported fever or chills. No changes in weight.  ENT: No visual disturbance, ear ache, epistaxis or sore throat.   CARDIOVASCULAR: Positive for left chest discomfort. Positive for waking with racing heart palpitations, he has note noticed any lower extremity edema.   RESPIRATORY: Chornic dyspnea upon exertion, unchanged. No cough, wheezing or hemoptysis. No orthopnea or PND reported.   GI: No reported abdominal pain,  nausea, vomiting or diarrhea. Does not report any changes since started PPI.   : No reported hematuria or dysuria. No hesitancy or incontinence.   NEUROLOGICAL: No lightheadedness, dizziness, syncope, ataxia, paresthesias or weakness.   HEMATOLOGIC: No history of anemia. No bleeding or excessive bruising. No history of blood clots.   MUSCULOSKELETAL: No new joint pain or swelling, no muscle pain. Positive for cervical and lumber stenosis- retired on disability due to this.   ENDOCRINOLOGIC: No temperature intolerance. No hair or skin changes.  SKIN: No abnormal rashes or sores, no unusual itching.  PSYCHIATRIC: Positive for history of depression and anxiety. Positive for excessive fatigue, difficulty sleeping, night terrors.     PHYSICAL EXAM:   /82 (BP Location: Right arm, Patient Position: Sitting, Cuff Size: Adult Large)   Pulse 88   Temp 98  F (36.7  C) (Tympanic)   Resp 18   Wt 119.3 kg (263 lb)   SpO2 95%   BMI 37.74 kg/m    GENERAL: The patient is a well-developed, well-nourished, in no apparent  distress.  HEENT: Head is normocephalic and atraumatic. Eyes are symmetrical with normal visual tracking. No icterus, no xanthelasmas. Nares appeared normal without nasal drainage. Mucous membranes are moist, no cyanosis.  NECK: Supple, no cervical bruits, JVP not visible.   CHEST/ LUNGS: Lungs clear to auscultation, no rales, rhonchi or wheezes, no use of accessory muscles, no retractions, respirations unlabored and normal respiratory rate.   CARDIO: Regular rate and rhythm normal with S1 and S2, no S3 or S4 and no murmur, click or rub.   ABD: Abdomen is nondistended.   EXTREMITIES: 1+ LE edema present bilaterally.   MUSCULOSKELETAL: No visible joint swelling.   NEUROLOGIC: Alert and oriented X3. Normal speech, gait and affect. No focal neurologic deficits.   SKIN: No jaundice. No rashes or visible skin lesions present. No ecchymosis.     EKG:    Normal sinus rhythm, rate 85 bpm, no ST-T changes    LAB RESULTS:   Office Visit on 03/06/2023   Component Date Value Ref Range Status     Ventricular Rate 03/06/2023 89  BPM Final     Atrial Rate 03/06/2023 89  BPM Final     GA Interval 03/06/2023 146  ms Final     QRS Duration 03/06/2023 90  ms Final     QT 03/06/2023 354  ms Final     QTc 03/06/2023 430  ms Final     P Axis 03/06/2023 35  degrees Final     R AXIS 03/06/2023 47  degrees Final     T [place] 03/06/2023 56  degrees Final     Interpretation ECG 03/06/2023    Final                    Value:Sinus rhythm  Normal ECG  No previous ECGs available  Confirmed by DO MULLIGAN STACY ([id number]) on 3/9/2023 6:23:15 AM       Troponin T, High Sensitivity 03/06/2023 15  <=22 ng/L Final     D-Dimer Quantitative 03/06/2023 0.48  0.00 - 0.50 ug/mL FEU Final     TSH 03/06/2023 0.78  0.30 - 4.20 uIU/mL Final     Sodium 03/06/2023 141  136 - 145 mmol/L Final     Potassium 03/06/2023 5.0  3.4 - 5.3 mmol/L Final     Chloride 03/06/2023 105  98 - 107 mmol/L Final     Carbon Dioxide (CO2) 03/06/2023 29  22 - 29 mmol/L Final     Anion Gap  03/06/2023 7  7 - 15 mmol/L Final     Urea Nitrogen 03/06/2023 14.6  6.0 - 20.0 mg/dL Final     Creatinine 03/06/2023 1.12  0.67 - 1.17 mg/dL Final     Calcium 03/06/2023 9.3  8.6 - 10.0 mg/dL Final     Glucose 03/06/2023 102 (H)  70 - 99 mg/dL Final     Alkaline Phosphatase 03/06/2023 89  40 - 129 U/L Final     AST 03/06/2023 18  10 - 50 U/L Final     ALT 03/06/2023 36  10 - 50 U/L Final     Protein Total 03/06/2023 7.4  6.4 - 8.3 g/dL Final     Albumin 03/06/2023 4.2  3.5 - 5.2 g/dL Final     Bilirubin Total 03/06/2023 0.4  <=1.2 mg/dL Final     GFR Estimate 03/06/2023 83  >60 mL/min/1.73m2 Final     WBC Count 03/06/2023 8.0  4.0 - 11.0 10e3/uL Final     RBC Count 03/06/2023 4.67  4.40 - 5.90 10e6/uL Final     Hemoglobin 03/06/2023 14.9  13.3 - 17.7 g/dL Final     Hematocrit 03/06/2023 44.5  40.0 - 53.0 % Final     MCV 03/06/2023 95  78 - 100 fL Final     MCH 03/06/2023 31.9  26.5 - 33.0 pg Final     MCHC 03/06/2023 33.5  31.5 - 36.5 g/dL Final     RDW 03/06/2023 12.7  10.0 - 15.0 % Final     Platelet Count 03/06/2023 191  150 - 450 10e3/uL Final     % Neutrophils 03/06/2023 62  % Final     % Lymphocytes 03/06/2023 26  % Final     % Monocytes 03/06/2023 8  % Final     % Eosinophils 03/06/2023 3  % Final     % Basophils 03/06/2023 0  % Final     % Immature Granulocytes 03/06/2023 1  % Final     NRBCs per 100 WBC 03/06/2023 0  <1 /100 Final     Absolute Neutrophils 03/06/2023 5.0  1.6 - 8.3 10e3/uL Final     Absolute Lymphocytes 03/06/2023 2.1  0.8 - 5.3 10e3/uL Final     Absolute Monocytes 03/06/2023 0.6  0.0 - 1.3 10e3/uL Final     Absolute Eosinophils 03/06/2023 0.3  0.0 - 0.7 10e3/uL Final     Absolute Basophils 03/06/2023 0.0  0.0 - 0.2 10e3/uL Final     Absolute Immature Granulocytes 03/06/2023 0.0  <=0.4 10e3/uL Final     Absolute NRBCs 03/06/2023 0.0  10e3/uL Final          ASSESSMENT:   Greg Mckenna presents for cardiology evaluation with recent reports of chest pain. Patient has a history of obesity,  "peptic ulcer disease, bursitis, HTN (currently on Amlodipine 10 mg dialy and Lisinopril 20 mg daily- started 11/2022), depression and severe spinal stenosis . He has never used tobacco, no history of DM despite family history significant for DM.    Today, patient reports symptom onset about two weeks ago. Symptoms of excessive fatigue, energy loss, racing heart,  \"little bit\" of chest pressure- localizes to left chest without radiation down arm, neck or back. Describes \"heart working harder and totally exhausted\". No increased dyspnea from baseline noted. Admits to additional symptoms of difficulty sleeping, horrifying dreams/night terrors- wakes with racing heart. Describes severe snoring, he is concerned likely sleep apnea. No lightheadedness or syncope. No edema.     1. Chest pain, unspecified type  2. Dyspnea on exertion  3. Essential hypertension  4. Habitual snoring  5. Night terrors  6. Malaise and fatigue  7. Class 2 obesity without serious comorbidity with body mass index (BMI) of 37.0 to 37.9 in adult, unspecified obesity type  8. Screening for diabetes mellitus  9. Edema of both lower legs    PLAN:   1. Referral for stress test with nonspecific chest pains and dyspnea. Multiple CAD risk factors. He cannot exercise due to spinal stenosis involving lumbar and cervical spine. Order placed for dobutamine stress echo.   2. BP controlled on amlodipine 10 mg daily and lisinopril 20 mg daily.  3. Lipid and DM screening today.  4. Referral for sleep study, suspected sleep apnea and struggling with nightly night terrors.  Excessive daytime somnolence.  5. Reviewed TTE results, limited study. LVEF 55-60%, no RWMA's. Unable to assess LV diastolic function. LE edema noted on exam today, suspect possible diastolic dysfunction. BNP today, reviewed sodium restriction. May consider starting loop diuretic.   Orders Placed This Encounter   Procedures     Lipid Profile     Hemoglobin A1c     N terminal pro BNP outpatient     " Extra Tube     Extra Serum Separator Tube (SST)     Adult Sleep Eval & Management Referral     EKG 12-lead, tracing only (Same Day)     Echo Stress Echocardiogram     Thank you for allowing me to participate in the care of your patient. Please do not hesitate to contact me if you have any questions.     Total time 62 minutes on date of encounter spent reviewing records, face-to-face time obtaining HPI, physical exam, reviewing results of recent testing and counseling on the above diagnoses and recommended plan of care.    Gabriela Callahan, APRN CNP [facility]

## 2023-04-11 NOTE — NURSING NOTE
"Chief Complaint   Patient presents with     Consult     Chest pain, SOB       Initial /82 (BP Location: Right arm, Patient Position: Sitting, Cuff Size: Adult Large)   Pulse 88   Temp 98  F (36.7  C) (Tympanic)   Resp 18   Wt 119.3 kg (263 lb)   SpO2 95%   BMI 37.74 kg/m   Estimated body mass index is 37.74 kg/m  as calculated from the following:    Height as of 4/4/23: 1.778 m (5' 10\").    Weight as of this encounter: 119.3 kg (263 lb).  Meds Reconciled: complete  Pt is not on Aspirin  Pt is not on a Statin  PHQ and/or NANCY reviewed. Pt referred to PCP/MH Provider as appropriate.    Michelle Siglaa LPN      "

## 2023-04-24 RX ORDER — ROSUVASTATIN CALCIUM 10 MG/1
10 TABLET, COATED ORAL EVERY EVENING
Qty: 90 TABLET | Refills: 3 | Status: SHIPPED | OUTPATIENT
Start: 2023-04-24 | End: 2023-12-07

## 2023-04-27 ENCOUNTER — TELEPHONE (OUTPATIENT)
Dept: CARDIOLOGY | Facility: OTHER | Age: 43
End: 2023-04-27
Payer: COMMERCIAL

## 2023-04-27 NOTE — TELEPHONE ENCOUNTER
Pt verified his .  Called to reschedule his stress test appointment.  Changed to 23 at 0900.  Pt verbalized understanding.  He will receive an instruction sheet in the mail.

## 2023-04-28 ENCOUNTER — DOCUMENTATION ONLY (OUTPATIENT)
Dept: SLEEP MEDICINE | Facility: HOSPITAL | Age: 43
End: 2023-04-28

## 2023-04-28 NOTE — PROGRESS NOTES
STOP BANG       Name: Greg Mckenna MRN# [id number]   Age: 43 year old YOB: 1980     Stop Bang questionnaire completed with a score of >3 to allow for HST     Have you been told you snore loudly (louder than talking or loud enough to be heard through doors)? YES    Do you often feel tired, fatigued, or sleepy during the daytime? YES    Has anyone observed you stop breathing during your sleep? YES    Do you have or are you being treated for high blood pressure? YES    Is your BMI greater than 35? YES    Is your neck size circumference 16 inches or greater? YES    Are you over 50 years old? NO    Stop Bang Score (# of yes): 7

## 2023-04-28 NOTE — PROGRESS NOTES
SLEEP HISTORY QUESTIONNAIRE    Please describe the main reason for your sleep appointment? I have recurring nightmares and can't get sleep. Believe have sleep apnea. Snore loud, stop breathing, have deviated septum as well.    How long has this been a problem? Year or so    Have you been diagnosed with a sleep problem in the past? NO    If so, what? n/a    What treatment was recommended? n/a    Have you had a sleep study in the past? NO    If yes, where and when? n/a    Sleep Habits:   Do you read in bed? No  Do you eat in bed? No  Do you watch TV in bed? No  Do you work in bed? No  Do you use a phone or computer in bed? Yes    Is you sleep disturbed by:   Bed partner: No  Children: No  Noise: No   Pets: No  Other: no      On two or more nights per week, do you drink alcohol to help you fall asleep?YES    On two or more nights per week, do you take melatonin to help you fall asleep? YES    On two or more nights per week, do you take over the counter medicine to fall asleep?  NO    Do you take drinks with caffeine (coffee, tea, soda, energy drinks)? NO    Do you have 3 or more caffeine drinks in a day? NO    Do you have caffeine drinks within 6 hours of bedtime? NO    Do you smoke or use tobacco? NO    Do you exercise? NO    Sleep Routine:   Using a 24 Hour Clock    What time do you usually get into bed on workdays? 1130pm    Weekend/non work days? ?    What time do you get out of bed on workdays? Am retired      Weekend/non work days??    Do you work the evening or night shift or do your shifts rotate? NO    How long does it usually take to fall to sleep? Few minutes    How many times do you wake during the night? 3-5    How much time do you feel that you are awake during the entire night? hours    How long does it take for you to fall back to sleep after you wake up? varies    Why do you think you wake up? Being attacked in dreams, bathroom    What do you do when you wake up? bathroom    How much sleep do you think  you get on work nights? 5-6    How much sleep do you think you get on weekends/non work days? ?    How much sleep do you think you need to feel your best? 7-9    How many days during a week do you take a nap on average? 5-7    What is the average length of your naps? hour    Do you feel better after taking a nap? YES    If you could chose the best sleep schedule for you, what time would you go to bed? 11pm  What time would you get up? 830am    Do you read in bed? NO    Do you eat in bed? NO    Do you watch TV in bed? NO    Do you do work in bed? NO    Do you use a computer or phone in bed? YES    Sleep Disruptions?   Leg movements:  Do you ever have restless, crawling, aching or other unusual feelings in your legs? YES    Do you ever wake yourself by kicking your legs during the night? YES    Are the sheets and blankets messed up or tossed about when you get up? YES    Night-time behaviors:   Do you have nightmares or night terrors? YES   How often? very    Have you had times when you were sleep walking? NO    Have you been seen doing anything unusual while you sleep at nights? NO  What? n/a  How often? n/a    Have you ever hurt yourself or someone else while you were sleeping?  Please describe: wake up kicking/punching    Do you clench or grind your teeth during the night? yes    Sleep Apnea (pauses in breathing during sleep):  Do you wake with a headache in the morning? YES  How often? sometimes    Does your bed partner, family or friends ever say that you snore? YES  How many nights per week do you snore? loud  Can snoring be heard outside the bedroom? yes    Do you ever wake yourself up from snoring, gasping or choking? YES    Have you ever been told that you stop breathing or have pauses in your breathing? YES    Do you wake in the morning with a dry throat or mouth? YES    Do you have trouble breathing through your nose? YES    Do you have problems with heartburn, reflux or a hiatal hernia? YES    Which  positions do you usually sleep in? (stomach, back, sides, all) all    Do you use oxygen or any other medical equipment when you sleep? NO    Do members of your family (related by blood) snore? YES    Have any members of your family been diagnosed with with sleep apnea? NO    Do other members of your family have restless leg? NO    Do other members of your family have sleep walking? NO    Have you ever had an accident, or near accident due to sleepiness while driving? NO    Does your sleepiness affect your work on the job or at school? NO    Do you ever fall asleep by accident while doing a task? NO    Have you had sudden muscle weakness when laughing, angry or surprised? NO    Have you ever been unable to move your body when falling asleep or waking up? NO    Do you ever have trouble  your dreams from real life events? YES  Please describe: I have watched myself die in dreams, just about every night.    Physical Health: (including illness and injury): During the past 30 days, on how many days was your physical health not good? Having some head issues/30 days     Mental Health: (including stress, depression, and problems with emotions): During the last 30 days, how may days was your mental health not good? 10/30 days.     During the past 30 days, on how many days did poor physical or mental health keep you from doing your usual activities? This might be self-care, work, or play? 0/30 days.     Social History:   Marital status:     Who lives in your home with you? 2 sons part time    Mother (alive or dead)? dead If has , from what? Lung cancer  Father (alive or dead)? alive If has , from what?     Siblings: YES  Have any ? NO  If so, from what? n/a    Currently working? NO  If yes, work: n/a  Former jobs: n/a     Sleepiness Scale:   Sitting and reading ?   Watching TV 0   Sitting in a public place 1   Riding in a car 2   Lying down to rest in the afternoon 2   Sitting and talking to  someone 0   Sitting quietly after a lunch without alcohol 0   In a car, stopping for a few minutes in traffic 0       Surgical History:   Past Surgical History:   Procedure Laterality Date     ADENOIDECTOMY      x2     OTHER SURGICAL HISTORY      329310,OTHER,x2     OTHER SURGICAL HISTORY      027627,OTHER     TONSILLECTOMY      No Comments Provided       Medical Conditions:   Past Medical History:   Diagnosis Date     Chronic sinusitis     No Comments Provided     Epididymitis     No Comments Provided     Personal history of other diseases of the female genital tract     Premature, born @ 23 weeks       Medications:   Current Outpatient Medications   Medication Sig     albuterol (PROAIR HFA/PROVENTIL HFA/VENTOLIN HFA) 108 (90 Base) MCG/ACT inhaler Inhale 2 puffs into the lungs every 4 hours as needed for shortness of breath / dyspnea or wheezing     amLODIPine (NORVASC) 10 MG tablet Take 1 tablet by mouth daily     lisinopril (ZESTRIL) 20 MG tablet Take 1 tablet by mouth daily     omeprazole (PRILOSEC) 20 MG DR capsule Take 1 capsule (20 mg) by mouth daily     rosuvastatin (CRESTOR) 10 MG tablet Take 1 tablet (10 mg) by mouth every evening     No current facility-administered medications for this visit.       Are you currently having any of the following symptoms?   General:   Obvious weight gain or loss NO  Fever, chills or sweats NO  Drug allergies: no    Eyes:   Changes in vision NO  Blind spots NO  Double vision NO  Other no    Ear, Nose and Throat:   Ear pain NO  Sore throat NO  Sinus pain YES  Post-nasal drip NO  Runny nose NO  Bloody nose NO    Heart:   Rapid or irregular heart beat NO  Chest pain or pressure YES  Out of breath when lying down YES  Swelling in feet or legs NO  High blood pressure YES  Heart disease NO    Nervous system   Headaches NO  Weakness in arms or legs NO  Numbness in arms of legs NO  Other: no    Skin  Rashes NO  New moles or skin changes NO  Other no    Lungs  Shortness of breath at  rest YES  Shortness of breath with activity YES  Dry cough YES  Coughing up mucous or phlegm YES  Coughing up blood NO  Wheezing when breathing YES    Lymph System  Swollen lymph nodes NO  New lumps or bumps NO  Changes in breasts or discharge NO    Digestive System   Nausea or vomiting NO  Loose or watery stools NO  Hard, dry stools (constipation) NO  Fat or grease in stools NO  Blood in stools NO  Stools are black or bloody NO  Abdominal (belly) pain NO    Urinary Tract   Pain when you urinate (pee) NO  Blood in your urine NO  Urinate (pee) more than normal NO  Irregular periods NO    Muscles and bones   Muscle pain YES  Joint or bone pain YES  Swollen joints NO  Other no    Glands  Increased thirst or urination NO  Diabetes NO  Morning glucose: no  Afternoon glucose: no    Mental Health  Depression NO  Anxiety NO  Other mental health issues: no

## 2023-04-28 NOTE — PROGRESS NOTES
"Chart review prior to sleep testing.    Patient Summary:  43 year old yo male who is referred for sleep-disordered breathing, recurrent nightmares.    Patient Active Problem List    Diagnosis Date Noted     Spinal stenosis of lumbosacral region 04/11/2023     Priority: Medium     Spinal stenosis in cervical region 04/11/2023     Priority: Medium     Bursitis 04/17/2018     Priority: Medium     Overview:   tenosynovistis right shoulder    Formatting of this note might be different from the original.  tenosynovistis right shoulder       Peptic ulcer disease 04/17/2018     Priority: Medium     Overweight 05/22/2012     Priority: Medium     Malaise and fatigue 05/22/2012     Priority: Medium     Major depressive disorder, single episode, moderate (H) 08/16/2011     Priority: Medium       Current Outpatient Medications   Medication     albuterol (PROAIR HFA/PROVENTIL HFA/VENTOLIN HFA) 108 (90 Base) MCG/ACT inhaler     amLODIPine (NORVASC) 10 MG tablet     lisinopril (ZESTRIL) 20 MG tablet     omeprazole (PRILOSEC) 20 MG DR capsule     rosuvastatin (CRESTOR) 10 MG tablet     No current facility-administered medications for this visit.       Pertinent PMHx of spinal stenosis, obesity, depression.    STOP-BANG score of 7, with unknown neck circumference.  [place] score of 5.  BMI of Estimated body mass index is 37.74 kg/m  as calculated from the following:    Height as of 4/4/23: 1.778 m (5' 10\").    Weight as of 4/11/23: 119.3 kg (263 lb).     Per questionnaire: \"I have recurring nightmares and can't get sleep. Believe have sleep apnea. Snore loud, stop breathing, have deviated septum as well.\"    Sxs for 1+ years, no prior sleep testing.    Caffeine use:  No for 3+ per day.  No for within 6 hours of bed.    Tobacco use: No    Sleep pattern:  11:30pm - ?, total sleep time 5-6 hours.  Time to fall asleep: ~few minutes.  Awakenings: 3-5 times per night, \"varies\" minutes to return to sleep, awake for total of \"hours\" per " night.  Napping.  5-7 days per week, 1 hours per nap.    Yes for RLS screen.  No for sleep walking.  Yes for dream enactment behavior.  Yes for bruxism.    Yes for morning headaches.  Yes for snoring.  Yes for observed apnea.  No for FHx of ELVIRA.    SHx:  , not currently working.    A/P:    1.)  High likelihood of ELVIRA with STOP-BANG score of 7.   - Would appear to be candidate for either home sleep testing or in-lab PSG.    2.)  Potential abnormal nocturnal behaviors  - Description may be consistent with confusional arousals, REM without atonia.    ---  This note was written with the assistance of the Dragon voice-dictation technology software. The final document, although reviewed, may contain errors. For corrections, please contact the office.    Jesus Simeon MD    Sleep Medicine    [facility]  - [place], [place]  o Main Office: [phone]    [facility] [place], [place]  o [address], [address]  o Schedule visits: [phone]  o Main Office: [phone]  o Fax: [phone]

## 2023-05-06 ENCOUNTER — HEALTH MAINTENANCE LETTER (OUTPATIENT)
Age: 43
End: 2023-05-06

## 2023-05-08 ENCOUNTER — TELEPHONE (OUTPATIENT)
Dept: CARDIOLOGY | Facility: OTHER | Age: 43
End: 2023-05-08
Payer: COMMERCIAL

## 2023-05-11 ENCOUNTER — HOSPITAL ENCOUNTER (OUTPATIENT)
Dept: CARDIOLOGY | Facility: OTHER | Age: 43
Discharge: HOME | End: 2023-05-11
Attending: NURSE PRACTITIONER | Admitting: NURSE PRACTITIONER
Payer: COMMERCIAL

## 2023-05-11 VITALS
RESPIRATION RATE: 18 BRPM | DIASTOLIC BLOOD PRESSURE: 82 MMHG | OXYGEN SATURATION: 94 % | SYSTOLIC BLOOD PRESSURE: 117 MMHG | HEART RATE: 82 BPM | WEIGHT: 263 LBS | BODY MASS INDEX: 37.65 KG/M2 | HEIGHT: 70 IN

## 2023-05-11 DIAGNOSIS — R07.9 CHEST PAIN, UNSPECIFIED TYPE: ICD-10-CM

## 2023-05-11 DIAGNOSIS — R06.09 DYSPNEA ON EXERTION: ICD-10-CM

## 2023-05-11 DIAGNOSIS — I10 ESSENTIAL HYPERTENSION: ICD-10-CM

## 2023-05-11 PROCEDURE — 93321 DOPPLER ECHO F-UP/LMTD STD: CPT | Mod: 26 | Performed by: INTERNAL MEDICINE

## 2023-05-11 PROCEDURE — 93325 DOPPLER ECHO COLOR FLOW MAPG: CPT | Mod: 26 | Performed by: INTERNAL MEDICINE

## 2023-05-11 PROCEDURE — 250N000011 HC RX IP 250 OP 636: Performed by: STUDENT IN AN ORGANIZED HEALTH CARE EDUCATION/TRAINING PROGRAM

## 2023-05-11 PROCEDURE — 93017 CV STRESS TEST TRACING ONLY: CPT

## 2023-05-11 PROCEDURE — 250N000011 HC RX IP 250 OP 636: Performed by: NURSE PRACTITIONER

## 2023-05-11 PROCEDURE — 93325 DOPPLER ECHO COLOR FLOW MAPG: CPT | Mod: TC

## 2023-05-11 PROCEDURE — 258N000003 HC RX IP 258 OP 636: Performed by: STUDENT IN AN ORGANIZED HEALTH CARE EDUCATION/TRAINING PROGRAM

## 2023-05-11 PROCEDURE — 93350 STRESS TTE ONLY: CPT | Mod: 26 | Performed by: INTERNAL MEDICINE

## 2023-05-11 PROCEDURE — 93018 CV STRESS TEST I&R ONLY: CPT | Performed by: STUDENT IN AN ORGANIZED HEALTH CARE EDUCATION/TRAINING PROGRAM

## 2023-05-11 PROCEDURE — 93016 CV STRESS TEST SUPVJ ONLY: CPT | Performed by: STUDENT IN AN ORGANIZED HEALTH CARE EDUCATION/TRAINING PROGRAM

## 2023-05-11 PROCEDURE — 255N000002 HC RX 255 OP 636: Performed by: NURSE PRACTITIONER

## 2023-05-11 RX ORDER — SODIUM CHLORIDE 9 MG/ML
INJECTION, SOLUTION INTRAVENOUS ONCE
Status: COMPLETED | OUTPATIENT
Start: 2023-05-11 | End: 2023-05-11

## 2023-05-11 RX ORDER — METOPROLOL TARTRATE 1 MG/ML
2.5 INJECTION, SOLUTION INTRAVENOUS
Status: DISCONTINUED | OUTPATIENT
Start: 2023-05-11 | End: 2023-05-12 | Stop reason: HOSPADM

## 2023-05-11 RX ORDER — ATROPINE SULFATE 0.1 MG/ML
.2-2 INJECTION INTRAVENOUS
Status: COMPLETED | OUTPATIENT
Start: 2023-05-11 | End: 2023-05-11

## 2023-05-11 RX ORDER — DOBUTAMINE HYDROCHLORIDE 200 MG/100ML
10-50 INJECTION INTRAVENOUS CONTINUOUS
Status: DISCONTINUED | OUTPATIENT
Start: 2023-05-11 | End: 2023-05-12 | Stop reason: HOSPADM

## 2023-05-11 RX ADMIN — ATROPINE SULFATE 0.2 MG: 0.1 INJECTION INTRAVENOUS at 10:02

## 2023-05-11 RX ADMIN — SODIUM CHLORIDE: 9 INJECTION, SOLUTION INTRAVENOUS at 10:05

## 2023-05-11 RX ADMIN — DOBUTAMINE HYDROCHLORIDE 10 MCG/KG/MIN: 200 INJECTION INTRAVENOUS at 09:41

## 2023-05-11 RX ADMIN — ATROPINE SULFATE 0.2 MG: 0.1 INJECTION INTRAVENOUS at 10:00

## 2023-05-11 RX ADMIN — PERFLUTREN 9 ML: 6.52 INJECTION, SUSPENSION INTRAVENOUS at 10:48

## 2023-05-11 NOTE — PROGRESS NOTES
Otolaryngology Consultation    Patient: Greg Mckenna  : 1980    Patient presents with:  Consult: Deviated Nasal Septum; Referred by Zuleika Holguin      HPI:  Greg Mckenna is a 43 year old male seen today for evaluation of a deviated nasal septum    He feels like 'something is stuck' in his nose.    He has chronic congestion  Occasional rhinorrhea    Denies afrin use    He denies a known history of chronic sinusitis.  He denies purulent rhinorrhea or chronic facial pressure or pain.  No recurrent use of antibiotics    No prior allergy testing.  He does not recognize a seasonal flare to his symptoms    He has had 2 significant prior nasal injures including one as a child    SNOT 35 with a problem with nasal blockage , severe problem with lack of a good night sleep waking up tired fatigue, reduced productivity    He has nightly heroic snoring apnea, zurdo apnea, nightmares and daytime somnolence  Upcoming HST    No pertinent imaging.    Never smoker  2 beers/day after work    Denies steroid use    No new home or environmental exposures  medically retired/disabled with chronic back pain    Awaiting DDS visit, booked out nearly 1 year      Current Outpatient Rx   Medication Sig Dispense Refill     albuterol (PROAIR HFA/PROVENTIL HFA/VENTOLIN HFA) 108 (90 Base) MCG/ACT inhaler Inhale 2 puffs into the lungs every 4 hours as needed for shortness of breath / dyspnea or wheezing 8.5 g 0     amLODIPine (NORVASC) 10 MG tablet Take 1 tablet by mouth daily       lisinopril (ZESTRIL) 20 MG tablet Take 1 tablet by mouth daily       omeprazole (PRILOSEC) 20 MG DR capsule Take 1 capsule (20 mg) by mouth daily 30 capsule 2     rosuvastatin (CRESTOR) 10 MG tablet Take 1 tablet (10 mg) by mouth every evening 90 tablet 3       Allergies: Patient has no known allergies.     Past Medical History:   Diagnosis Date     Chronic sinusitis     No Comments Provided     Epididymitis     No Comments Provided     Personal history of other  "diseases of the female genital tract     Premature, born @ 23 weeks       Past Surgical History:   Procedure Laterality Date     ADENOIDECTOMY      x2     OTHER SURGICAL HISTORY      049212,OTHER,x2     OTHER SURGICAL HISTORY      413554,OTHER     TONSILLECTOMY      No Comments Provided       ENT family history reviewed    Social History     Tobacco Use     Smoking status: Never     Smokeless tobacco: Never   Vaping Use     Vaping status: Never Used   Substance Use Topics     Alcohol use: Yes     Alcohol/week: 14.0 standard drinks of alcohol     Types: 14 Cans of beer per week     Comment: 2 beers a day     Drug use: Never     Comment: Topical CBD creme       Review of Systems  ROS: 10 point ROS neg other than the symptoms noted above in the HPI and shortness of breath on exertion    Physical Exam  /70 (BP Location: Left arm, Cuff Size: Adult Regular)   Pulse 92   Temp 97.3  F (36.3  C) (Tympanic)   Ht 1.778 m (5' 10\")   Wt 117.9 kg (260 lb)   SpO2 96%   BMI 37.31 kg/m    General - The patient is well nourished and well developed, and appears to have good nutritional status.  Alert and oriented to person and place, answers questions and cooperates with examination appropriately.  cushingoid appearance face  Head and Face - Normocephalic and atraumatic, with no gross asymmetry noted.  The facial nerve is intact, with strong symmetric movements.  Voice and Breathing - The patient was breathing comfortably without the use of accessory muscles. There was no wheezing, stridor, or stertor.  The patients voice was clear and strong, and had appropriate pitch and quality.  No zurdo peripheral digital clubbing or cyanosis   Ears -The external auditory canals are patent, the tympanic membranes are intact without effusion, retraction or mass.  Bony landmarks are intact.  Eyes - Extraocular movements intact, and the pupils were reactive to light.  Sclera were not icteric or injected, conjunctiva were pink and " moist.  Mouth - Examination of the oral cavity showed pink, healthy oral mucosa. No lesions or ulcerations noted.  The tongue was mobile and midline, and the dentition were in scattered condition.    Throat - The walls of the oropharynx were smooth, pink, moist, symmetric, and had no lesions or ulcerations.  The tonsillar pillars and soft palate were symmetric.  The uvula was midline on elevation.  Dela Cruz Palate Position IV, minimal grade 1 tonsil tissue  Neck - No palpable enlarged fixed cervical lymph nodes.  No neck cysts or unusual tenderness to palpation.   No palpable fixed thyroid nodules or concerning goiter.  The trachea is grossly midline.   Nose - External contour is symmetric, no gross deflection or scars.    Severe mucosal edema , no purulence  To evaluate the nose and sinuses, I performed rigid nasal endoscopy.  I applied topical nasal lidocaine and neosynephrine.    I began with the LEFT side using a 0 degree rigid nasal endoscope, and then similarly examined the RIGHT side    Findings:  Inferior turbinates:  4+  Middle turbinate and middle meatus:  No purulence, diffuse severe edema of the uncinates with a possible polyp or mass at the natural maxillary ostia on the left versus severe edema  Superior meatus was evaluated  Frontal recess  narrowed secondary to edema  Mucosa is edematous throughout,  No zurdo polyps   Sphenoethmoidal recess without purulence   Nasopharynx clear, et patent, no mass  The patient tolerated the procedure well      Impression and Plan- Greg Mckenna is a 43 year old male with:    ICD-10-CM    1. Nasal turbinate hypertrophy  J34.3 fluticasone (FLONASE) 50 MCG/ACT nasal spray     budesonide (PULMICORT) 0.5 MG/2ML neb solution     CT Sinus w/o Contrast     Inhalent Panel [place] Region (Serolab)     Total IgE (Serolab)      2. DNS (deviated nasal septum)  J34.2 fluticasone (FLONASE) 50 MCG/ACT nasal spray     budesonide (PULMICORT) 0.5 MG/2ML neb solution      3. Nasal  obstruction  J34.89 CT Sinus w/o Contrast     Inhalent Panel [place] (Serolab)     Total IgE (Serolab)      4. ELVIRA (obstructive sleep apnea)  G47.33       5. Hypertrophic soft palate  K13.79           Start Flonase and Budesonide Rinses  Complete Sinus CT Scan  Complete Allergy Blood Testing  Follow up with me after Ct completed    CT sinus dated 5/17/2023 shows clear sinuses throughout with aplastic frontal sinuses.  There is a left septal deviation with a mid to superior spur.  It appears he has had prior septoplasty with the quadrangular cartilage displaced off the maxillary crest consider revision tract septoplasty and turbinate reduction.  He should see me for surgery only after he completes his sleep study and starts wearing his CPAP    Budesonide instructions  Make the Justin Med Saline Solution using 2 packages of salt and previously boiled or distilled water.  This will make 240 ml of saline solution.  Mix the entire vial of budesonide into the solution.   Irrigate your nose 2 times a day with the warm budesonide solution using 1 bottle between nostrils in the morning, and one bottle at night.       I messaged DIANA Torres regarding cushingoid appearance for complete w/u    Risks of untreated sleep apnea are well documented, including but not limited to, the inability to reach restorative sleep leading to daytime somnolence, fatigue, irritability and poor work performance, risk of motor vehicle accidents, depression, memory issues, waking headaches, impotence, and increased nocturia.  More serious risks include concerns with medication/narcotic use and surgery related to the use of anesthesia, coronary artery disease, heart failure, heart attack, stroke and sudden death.    Achieving a healthy weight, adhering to a healthy diet, and exercise are very important in the treatment of sleep apnea and were discussed and encouraged.    Clinical evidence shows CPAP to be the treatment of choice for  obstructive sleep apnea. However, if CPAP is not tolerated after reasonable attempts at treatment, surgical intervention may be necessary.   A sleep study will be arranged if this has not already been done.        Melissa Ordaz D.O.  Otolaryngology/Head and Neck Surgery  Allergy

## 2023-05-11 NOTE — PROGRESS NOTES
0860 The patient arrived for a Dobutamine stress echo.  The procedure, risks and benefits were discussed and the consent was signed.  The patient was prepped for the stress test, an IV was started,  and the echo sonographer did the initial images with Definity for image enhancement.   Dr. Parsons arrived, and the Dobutamine protocol was started via multistep infusion.  The patient tolerated the procedure.  Stress images were completed, normal saline was infused post Dobutamine,  the IV was removed. The patient was informed that the test results would be given to them by OSVALDO Callahan .  If you have not received your results within 3 days please call the ordering provider.  The patient was released in stable condition.  Please see the chart for complete test results.

## 2023-05-12 ENCOUNTER — OFFICE VISIT (OUTPATIENT)
Dept: OTOLARYNGOLOGY | Facility: OTHER | Age: 43
End: 2023-05-12
Attending: OTOLARYNGOLOGY
Payer: COMMERCIAL

## 2023-05-12 VITALS
HEIGHT: 70 IN | TEMPERATURE: 97.3 F | OXYGEN SATURATION: 96 % | DIASTOLIC BLOOD PRESSURE: 70 MMHG | HEART RATE: 92 BPM | WEIGHT: 260 LBS | SYSTOLIC BLOOD PRESSURE: 102 MMHG | BODY MASS INDEX: 37.22 KG/M2

## 2023-05-12 DIAGNOSIS — K13.79 HYPERTROPHIC SOFT PALATE: ICD-10-CM

## 2023-05-12 DIAGNOSIS — J34.2 DNS (DEVIATED NASAL SEPTUM): ICD-10-CM

## 2023-05-12 DIAGNOSIS — J34.3 NASAL TURBINATE HYPERTROPHY: Primary | ICD-10-CM

## 2023-05-12 DIAGNOSIS — J34.89 NASAL OBSTRUCTION: ICD-10-CM

## 2023-05-12 DIAGNOSIS — G47.33 OSA (OBSTRUCTIVE SLEEP APNEA): ICD-10-CM

## 2023-05-12 PROCEDURE — 36415 COLL VENOUS BLD VENIPUNCTURE: CPT | Performed by: OTOLARYNGOLOGY

## 2023-05-12 PROCEDURE — 86003 ALLG SPEC IGE CRUDE XTRC EA: CPT | Mod: 90 | Performed by: OTOLARYNGOLOGY

## 2023-05-12 PROCEDURE — 82785 ASSAY OF IGE: CPT | Mod: 90 | Performed by: OTOLARYNGOLOGY

## 2023-05-12 PROCEDURE — 31231 NASAL ENDOSCOPY DX: CPT | Performed by: OTOLARYNGOLOGY

## 2023-05-12 PROCEDURE — 99204 OFFICE O/P NEW MOD 45 MIN: CPT | Mod: 25 | Performed by: OTOLARYNGOLOGY

## 2023-05-12 RX ORDER — BUDESONIDE 0.5 MG/2ML
INHALANT ORAL
Qty: 200 ML | Refills: 11 | Status: SHIPPED | OUTPATIENT
Start: 2023-05-12

## 2023-05-12 RX ORDER — FLUTICASONE PROPIONATE 50 MCG
2 SPRAY, SUSPENSION (ML) NASAL DAILY
Qty: 16 G | Refills: 12 | Status: SHIPPED | OUTPATIENT
Start: 2023-05-12 | End: 2023-12-07

## 2023-05-12 ASSESSMENT — PAIN SCALES - GENERAL: PAINLEVEL: NO PAIN (0)

## 2023-05-12 NOTE — PATIENT INSTRUCTIONS
Thank you for allowing Dr. Ordaz and our ENT team to participate in your care.  If your medications are too expensive, please give the nurse a call.  We can possibly change this medication.  If you have a scheduling or an appointment question please contact our Health Unit Coordinator at their direct line [phone].   ALL nursing questions or concerns can be directed to your ENT nurse at: [phone] - [place]    Start Flonase and Budesonide Rinses  Complete Sinus CT Scan  Complete Allergy Blood Testing  Follow up with Dr. Ordaz    Budesonide instructions  Make the Justin Med Saline Solution using 2 packages of salt and previously boiled or distilled water.  This will make 240 ml of saline solution.  Mix the entire vial of budesonide into the solution.   Irrigate your nose 2 times a day with the warm budesonide solution using 1 bottle between nostrils in the morning, and one bottle at night.

## 2023-05-12 NOTE — LETTER
2023         RE: Greg Mckenna  [address] [facility] [address]        Dear Colleague,    Thank you for referring your patient, Greg Mckenna, to the [facility]. Please see a copy of my visit note below.    Otolaryngology Consultation    Patient: Greg Mckenna  : 1980    Patient presents with:  Consult: Deviated Nasal Septum; Referred by Zuleika Holguin      HPI:  Greg Mckenna is a 43 year old male seen today for evaluation of a deviated nasal septum    He feels like 'something is stuck' in his nose.    He has chronic congestion  Occasional rhinorrhea    Denies afrin use    He denies a known history of chronic sinusitis.  He denies purulent rhinorrhea or chronic facial pressure or pain.  No recurrent use of antibiotics    No prior allergy testing.  He does not recognize a seasonal flare to his symptoms    He has had 2 significant prior nasal injures including one as a child    SNOT 35 with a problem with nasal blockage , severe problem with lack of a good night sleep waking up tired fatigue, reduced productivity    He has nightly heroic snoring apnea, zurdo apnea, nightmares and daytime somnolence  Upcoming HST    No pertinent imaging.    Never smoker  2 beers/day after work    Denies steroid use    No new home or environmental exposures  medically retired/disabled with chronic back pain    Awaiting DDS visit, booked out nearly 1 year      Current Outpatient Rx   Medication Sig Dispense Refill     albuterol (PROAIR HFA/PROVENTIL HFA/VENTOLIN HFA) 108 (90 Base) MCG/ACT inhaler Inhale 2 puffs into the lungs every 4 hours as needed for shortness of breath / dyspnea or wheezing 8.5 g 0     amLODIPine (NORVASC) 10 MG tablet Take 1 tablet by mouth daily       lisinopril (ZESTRIL) 20 MG tablet Take 1 tablet by mouth daily       omeprazole (PRILOSEC) 20 MG DR capsule Take 1 capsule (20 mg) by mouth daily 30 capsule 2     rosuvastatin (CRESTOR) 10 MG tablet Take 1 tablet (10 mg) by  "mouth every evening 90 tablet 3       Allergies: Patient has no known allergies.     Past Medical History:   Diagnosis Date     Chronic sinusitis     No Comments Provided     Epididymitis     No Comments Provided     Personal history of other diseases of the female genital tract     Premature, born @ 23 weeks       Past Surgical History:   Procedure Laterality Date     ADENOIDECTOMY      x2     OTHER SURGICAL HISTORY      424337,OTHER,x2     OTHER SURGICAL HISTORY      624564,OTHER     TONSILLECTOMY      No Comments Provided       ENT family history reviewed    Social History     Tobacco Use     Smoking status: Never     Smokeless tobacco: Never   Vaping Use     Vaping status: Never Used   Substance Use Topics     Alcohol use: Yes     Alcohol/week: 14.0 standard drinks of alcohol     Types: 14 Cans of beer per week     Comment: 2 beers a day     Drug use: Never     Comment: Topical CBD creme       Review of Systems  ROS: 10 point ROS neg other than the symptoms noted above in the HPI and shortness of breath on exertion    Physical Exam  /70 (BP Location: Left arm, Cuff Size: Adult Regular)   Pulse 92   Temp 97.3  F (36.3  C) (Tympanic)   Ht 1.778 m (5' 10\")   Wt 117.9 kg (260 lb)   SpO2 96%   BMI 37.31 kg/m    General - The patient is well nourished and well developed, and appears to have good nutritional status.  Alert and oriented to person and place, answers questions and cooperates with examination appropriately.  cushingoid appearance face  Head and Face - Normocephalic and atraumatic, with no gross asymmetry noted.  The facial nerve is intact, with strong symmetric movements.  Voice and Breathing - The patient was breathing comfortably without the use of accessory muscles. There was no wheezing, stridor, or stertor.  The patients voice was clear and strong, and had appropriate pitch and quality.  No zurdo peripheral digital clubbing or cyanosis   Ears -The external auditory canals are patent, the " tympanic membranes are intact without effusion, retraction or mass.  Bony landmarks are intact.  Eyes - Extraocular movements intact, and the pupils were reactive to light.  Sclera were not icteric or injected, conjunctiva were pink and moist.  Mouth - Examination of the oral cavity showed pink, healthy oral mucosa. No lesions or ulcerations noted.  The tongue was mobile and midline, and the dentition were in scattered condition.    Throat - The walls of the oropharynx were smooth, pink, moist, symmetric, and had no lesions or ulcerations.  The tonsillar pillars and soft palate were symmetric.  The uvula was midline on elevation.  Dela Cruz Palate Position IV, minimal grade 1 tonsil tissue  Neck - No palpable enlarged fixed cervical lymph nodes.  No neck cysts or unusual tenderness to palpation.   No palpable fixed thyroid nodules or concerning goiter.  The trachea is grossly midline.   Nose - External contour is symmetric, no gross deflection or scars.    Severe mucosal edema , no purulence  To evaluate the nose and sinuses, I performed rigid nasal endoscopy.  I applied topical nasal lidocaine and neosynephrine.    I began with the LEFT side using a 0 degree rigid nasal endoscope, and then similarly examined the RIGHT side    Findings:  Inferior turbinates:  4+  Middle turbinate and middle meatus:  No purulence, diffuse severe edema of the uncinates with a possible polyp or mass at the natural maxillary ostia on the left versus severe edema  Superior meatus was evaluated  Frontal recess  narrowed secondary to edema  Mucosa is edematous throughout,  No zurdo polyps   Sphenoethmoidal recess without purulence   Nasopharynx clear, et patent, no mass  The patient tolerated the procedure well      Impression and Plan- Greg Mckenna is a 43 year old male with:    ICD-10-CM    1. Nasal turbinate hypertrophy  J34.3 fluticasone (FLONASE) 50 MCG/ACT nasal spray     budesonide (PULMICORT) 0.5 MG/2ML neb solution     CT Sinus  w/o Contrast     Inhalent Panel [place] (Serolab)     Total IgE (Serolab)      2. DNS (deviated nasal septum)  J34.2 fluticasone (FLONASE) 50 MCG/ACT nasal spray     budesonide (PULMICORT) 0.5 MG/2ML neb solution      3. Nasal obstruction  J34.89 CT Sinus w/o Contrast     Inhalent Panel [place] (Serolab)     Total IgE (Serolab)      4. ELVIRA (obstructive sleep apnea)  G47.33       5. Hypertrophic soft palate  K13.79           Start Flonase and Budesonide Rinses  Complete Sinus CT Scan  Complete Allergy Blood Testing  Follow up with me after Ct completed    Budesonide instructions  Make the Justin Med Saline Solution using 2 packages of salt and previously boiled or distilled water.  This will make 240 ml of saline solution.  Mix the entire vial of budesonide into the solution.   Irrigate your nose 2 times a day with the warm budesonide solution using 1 bottle between nostrils in the morning, and one bottle at night.       I messaged DIANA Torres regarding cushingoid appearance for complete w/u    Risks of untreated sleep apnea are well documented, including but not limited to, the inability to reach restorative sleep leading to daytime somnolence, fatigue, irritability and poor work performance, risk of motor vehicle accidents, depression, memory issues, waking headaches, impotence, and increased nocturia.  More serious risks include concerns with medication/narcotic use and surgery related to the use of anesthesia, coronary artery disease, heart failure, heart attack, stroke and sudden death.    Achieving a healthy weight, adhering to a healthy diet, and exercise are very important in the treatment of sleep apnea and were discussed and encouraged.    Clinical evidence shows CPAP to be the treatment of choice for obstructive sleep apnea. However, if CPAP is not tolerated after reasonable attempts at treatment, surgical intervention may be necessary.   A sleep study will be arranged if this has not already been  done.        Melissa Odraz D.O.  Otolaryngology/Head and Neck Surgery  Allergy          Again, thank you for allowing me to participate in the care of your patient.        Sincerely,        Melissa Ordaz MD

## 2023-05-15 ENCOUNTER — TELEPHONE (OUTPATIENT)
Dept: FAMILY MEDICINE | Facility: OTHER | Age: 43
End: 2023-05-15
Payer: COMMERCIAL

## 2023-05-15 NOTE — TELEPHONE ENCOUNTER
Please call patient and have him schedule a follow up primary care appointment. Not sure if ENT discussed with him but I received a staff message requesting additional evaluation to rule out adrenal disease.   Zuleika Holguin PA-C on 5/15/2023 at 7:50 AM

## 2023-05-17 ENCOUNTER — HOSPITAL ENCOUNTER (OUTPATIENT)
Dept: CT IMAGING | Facility: HOSPITAL | Age: 43
Discharge: HOME | End: 2023-05-17
Attending: OTOLARYNGOLOGY | Admitting: OTOLARYNGOLOGY
Payer: COMMERCIAL

## 2023-05-17 DIAGNOSIS — J34.3 NASAL TURBINATE HYPERTROPHY: ICD-10-CM

## 2023-05-17 DIAGNOSIS — J34.89 NASAL OBSTRUCTION: ICD-10-CM

## 2023-05-17 PROCEDURE — 70486 CT MAXILLOFACIAL W/O DYE: CPT

## 2023-05-17 NOTE — PROGRESS NOTES
Assessment & Plan     1. Cushingoid facies  2. Class 2 obesity without serious comorbidity with body mass index (BMI) of 37.0 to 37.9 in adult, unspecified obesity type  3. Fatigue, unspecified type  Chronic, will pursue additional evaluation to rule out Cushing's disease.  Dexamethasone sent for dexamethasone suppression test.  Patient will schedule lab only appointment for completion of early morning cortisol.  Will notify with results and refer if indicated.  - Cortisol; Future  - dexamethasone (DECADRON) 1 MG tablet; Take 1 tablet (1 mg) by mouth 2 times daily (with meals)  Dispense: 1 tablet; Refill: 0      No follow-ups on file.    Zuleika Holguin PA-C  [facility]   Greg is a 43 year old, presenting for the following health issues:  Consult      HPI   Here for follow-up for rule out of possible adrenal disease.  Patient was referred to ENT for evaluation of deviated septum.  At that time ENT felt he had mild facies and was concern for Cushing's.  She had referred back with request for evaluation.  Patient does carry weight in his abdomen and has a more round face.  Does struggle with fatigue but he has attributed this to sleep apnea which she is working on obtaining sleep study to start CPAP.  Does struggle with back aches secondary to known spinal stenosis.  Does not struggling with additional adrenal symptoms.    PAST MEDICAL HISTORY:   Past Medical History:   Diagnosis Date     Chronic sinusitis     No Comments Provided     Epididymitis     No Comments Provided     Personal history of other diseases of the female genital tract     Premature, born @ 23 weeks       PAST SURGICAL HISTORY:   Past Surgical History:   Procedure Laterality Date     ADENOIDECTOMY      x2     OTHER SURGICAL HISTORY      945961,OTHER,x2     OTHER SURGICAL HISTORY      087640,OTHER     TONSILLECTOMY      No Comments Provided       FAMILY HISTORY:   Family History   Problem Relation Age of  "Onset     Hypertension Father         Hypertension     Other - See Comments Father         hypercholesterolemia/back problems     Other - See Comments Mother         MS     Diabetes Other         Diabetes,FH       SOCIAL HISTORY:   Social History     Tobacco Use     Smoking status: Never     Smokeless tobacco: Never   Vaping Use     Vaping status: Never Used   Substance Use Topics     Alcohol use: Yes     Alcohol/week: 14.0 standard drinks of alcohol     Types: 14 Cans of beer per week     Comment: 2 beers a day      No Known Allergies  Current Outpatient Medications   Medication     albuterol (PROAIR HFA/PROVENTIL HFA/VENTOLIN HFA) 108 (90 Base) MCG/ACT inhaler     amLODIPine (NORVASC) 10 MG tablet     budesonide (PULMICORT) 0.5 MG/2ML neb solution     dexamethasone (DECADRON) 1 MG tablet     fluticasone (FLONASE) 50 MCG/ACT nasal spray     lisinopril (ZESTRIL) 20 MG tablet     omeprazole (PRILOSEC) 20 MG DR capsule     rosuvastatin (CRESTOR) 10 MG tablet     No current facility-administered medications for this visit.         Review of Systems   Per HPI        Objective    /78   Pulse 90   Temp 97.1  F (36.2  C)   Resp 14   Ht 1.778 m (5' 10\")   Wt 118.8 kg (261 lb 12.8 oz)   SpO2 96%   BMI 37.56 kg/m    Body mass index is 37.56 kg/m .  Physical Exam   General: Pleasant, in no apparent distress.  Rounded face..  Abdomen: Obese.  Psych: Appropriate mood and affect.          "

## 2023-05-18 ENCOUNTER — OFFICE VISIT (OUTPATIENT)
Dept: FAMILY MEDICINE | Facility: OTHER | Age: 43
End: 2023-05-18
Attending: PHYSICIAN ASSISTANT
Payer: COMMERCIAL

## 2023-05-18 VITALS
OXYGEN SATURATION: 96 % | WEIGHT: 261.8 LBS | HEIGHT: 70 IN | BODY MASS INDEX: 37.48 KG/M2 | DIASTOLIC BLOOD PRESSURE: 78 MMHG | HEART RATE: 90 BPM | SYSTOLIC BLOOD PRESSURE: 132 MMHG | TEMPERATURE: 97.1 F | RESPIRATION RATE: 14 BRPM

## 2023-05-18 DIAGNOSIS — R53.83 FATIGUE, UNSPECIFIED TYPE: ICD-10-CM

## 2023-05-18 DIAGNOSIS — E66.812 CLASS 2 OBESITY WITHOUT SERIOUS COMORBIDITY WITH BODY MASS INDEX (BMI) OF 37.0 TO 37.9 IN ADULT, UNSPECIFIED OBESITY TYPE: ICD-10-CM

## 2023-05-18 DIAGNOSIS — R68.89 CUSHINGOID FACIES: Primary | ICD-10-CM

## 2023-05-18 PROCEDURE — 99214 OFFICE O/P EST MOD 30 MIN: CPT | Performed by: PHYSICIAN ASSISTANT

## 2023-05-18 RX ORDER — DEXAMETHASONE 1 MG
1 TABLET ORAL 2 TIMES DAILY WITH MEALS
Qty: 1 TABLET | Refills: 0 | Status: SHIPPED | OUTPATIENT
Start: 2023-05-18 | End: 2023-12-07

## 2023-05-18 ASSESSMENT — PAIN SCALES - GENERAL: PAINLEVEL: MILD PAIN (2)

## 2023-05-18 ASSESSMENT — PATIENT HEALTH QUESTIONNAIRE - PHQ9
SUM OF ALL RESPONSES TO PHQ QUESTIONS 1-9: 2
SUM OF ALL RESPONSES TO PHQ QUESTIONS 1-9: 2
10. IF YOU CHECKED OFF ANY PROBLEMS, HOW DIFFICULT HAVE THESE PROBLEMS MADE IT FOR YOU TO DO YOUR WORK, TAKE CARE OF THINGS AT HOME, OR GET ALONG WITH OTHER PEOPLE: NOT DIFFICULT AT ALL

## 2023-05-18 NOTE — NURSING NOTE
Patient presents to clinic to discuss being checked for adrenal diseases.  Lalita Estrella LPN ....................  5/18/2023   3:40 PM

## 2023-05-21 ENCOUNTER — MYC MEDICAL ADVICE (OUTPATIENT)
Dept: FAMILY MEDICINE | Facility: OTHER | Age: 43
End: 2023-05-21
Payer: COMMERCIAL

## 2023-05-21 DIAGNOSIS — R53.83 FATIGUE, UNSPECIFIED TYPE: ICD-10-CM

## 2023-05-21 DIAGNOSIS — E66.812 CLASS 2 OBESITY WITHOUT SERIOUS COMORBIDITY WITH BODY MASS INDEX (BMI) OF 37.0 TO 37.9 IN ADULT, UNSPECIFIED OBESITY TYPE: ICD-10-CM

## 2023-05-21 DIAGNOSIS — R68.89 CUSHINGOID FACIES: ICD-10-CM

## 2023-05-22 ENCOUNTER — MYC MEDICAL ADVICE (OUTPATIENT)
Dept: FAMILY MEDICINE | Facility: OTHER | Age: 43
End: 2023-05-22
Payer: COMMERCIAL

## 2023-05-22 ENCOUNTER — TELEPHONE (OUTPATIENT)
Dept: FAMILY MEDICINE | Facility: OTHER | Age: 43
End: 2023-05-22
Payer: COMMERCIAL

## 2023-05-22 LAB — SCANNED LAB RESULT: NORMAL

## 2023-05-22 RX ORDER — DEXAMETHASONE 1 MG
1 TABLET ORAL 2 TIMES DAILY WITH MEALS
Qty: 2 TABLET | Refills: 0 | Status: CANCELLED | OUTPATIENT
Start: 2023-05-22

## 2023-05-22 NOTE — TELEPHONE ENCOUNTER
The patient called regarding a cough.  He thinks is it from his blood pressure medicine.  He wants to know what to do and if he should change this.

## 2023-05-22 NOTE — TELEPHONE ENCOUNTER
[facility]   Please call back  May need a work in         Ca Hayes on 5/22/2023 at 3:53 PM

## 2023-05-22 NOTE — TELEPHONE ENCOUNTER
Please have patient schedule a follow up telephone or in person visit to discuss other BP med options.   Zuleika Holguin PA-C on 5/22/2023 at 12:34 PM

## 2023-05-22 NOTE — TELEPHONE ENCOUNTER
Lalita had called David last week and clarified on the dosing. Should be one pill one time around 11pm the night before lab draw.     If he is retaining fluid I would recommend a visit as we have not discussed this previously. It is possible it could be a side effect of amlodipine but there are also several other causes that would need to be evaluated for.    Zuleika Holguin PA-C on 5/22/2023 at 10:05 AM

## 2023-05-23 ENCOUNTER — OFFICE VISIT (OUTPATIENT)
Dept: FAMILY MEDICINE | Facility: OTHER | Age: 43
End: 2023-05-23
Attending: PHYSICAL MEDICINE & REHABILITATION
Payer: COMMERCIAL

## 2023-05-23 ENCOUNTER — HOSPITAL ENCOUNTER (OUTPATIENT)
Dept: GENERAL RADIOLOGY | Facility: OTHER | Age: 43
Discharge: HOME | End: 2023-05-23
Payer: COMMERCIAL

## 2023-05-23 VITALS
BODY MASS INDEX: 38.49 KG/M2 | WEIGHT: 259.9 LBS | RESPIRATION RATE: 16 BRPM | DIASTOLIC BLOOD PRESSURE: 79 MMHG | HEART RATE: 85 BPM | TEMPERATURE: 98.4 F | OXYGEN SATURATION: 95 % | SYSTOLIC BLOOD PRESSURE: 112 MMHG | HEIGHT: 69 IN

## 2023-05-23 DIAGNOSIS — R05.1 ACUTE COUGH: ICD-10-CM

## 2023-05-23 DIAGNOSIS — J18.9 PNEUMONIA OF RIGHT MIDDLE LOBE DUE TO INFECTIOUS ORGANISM: Primary | ICD-10-CM

## 2023-05-23 LAB
FLUAV RNA SPEC QL NAA+PROBE: NEGATIVE
FLUBV RNA RESP QL NAA+PROBE: NEGATIVE
RSV RNA SPEC NAA+PROBE: NEGATIVE
SARS-COV-2 RNA RESP QL NAA+PROBE: NEGATIVE

## 2023-05-23 PROCEDURE — 87637 SARSCOV2&INF A&B&RSV AMP PRB: CPT | Mod: ZL

## 2023-05-23 PROCEDURE — 71046 X-RAY EXAM CHEST 2 VIEWS: CPT

## 2023-05-23 PROCEDURE — 99214 OFFICE O/P EST MOD 30 MIN: CPT

## 2023-05-23 PROCEDURE — C9803 HOPD COVID-19 SPEC COLLECT: HCPCS

## 2023-05-23 RX ORDER — AMOXICILLIN 500 MG/1
1000 CAPSULE ORAL 3 TIMES DAILY
Qty: 30 CAPSULE | Refills: 0 | Status: SHIPPED | OUTPATIENT
Start: 2023-05-23 | End: 2023-05-28

## 2023-05-23 RX ORDER — AZITHROMYCIN 250 MG/1
TABLET, FILM COATED ORAL
Qty: 6 TABLET | Refills: 0 | Status: SHIPPED | OUTPATIENT
Start: 2023-05-23 | End: 2023-05-28

## 2023-05-23 RX ORDER — BENZONATATE 100 MG/1
100 CAPSULE ORAL 3 TIMES DAILY PRN
Qty: 30 CAPSULE | Refills: 0 | Status: SHIPPED | OUTPATIENT
Start: 2023-05-23

## 2023-05-23 ASSESSMENT — PATIENT HEALTH QUESTIONNAIRE - PHQ9
SUM OF ALL RESPONSES TO PHQ QUESTIONS 1-9: 3
10. IF YOU CHECKED OFF ANY PROBLEMS, HOW DIFFICULT HAVE THESE PROBLEMS MADE IT FOR YOU TO DO YOUR WORK, TAKE CARE OF THINGS AT HOME, OR GET ALONG WITH OTHER PEOPLE: NOT DIFFICULT AT ALL
SUM OF ALL RESPONSES TO PHQ QUESTIONS 1-9: 3

## 2023-05-23 ASSESSMENT — PAIN SCALES - GENERAL: PAINLEVEL: MILD PAIN (2)

## 2023-05-23 NOTE — PROGRESS NOTES
"Chief Complaint   Patient presents with     Cough     Started in November but in the last week he has noticed an increase in coughing. Reports it has been a dry cough. Feels SOB at times after \"coughing fits\".       Initial /79 (BP Location: Right arm, Patient Position: Sitting, Cuff Size: Adult Large)   Pulse 85   Temp 98.4  F (36.9  C) (Tympanic)   Resp 16   Ht 1.753 m (5' 9\")   Wt 117.9 kg (259 lb 14.4 oz)   SpO2 95%   BMI 38.38 kg/m   Estimated body mass index is 38.38 kg/m  as calculated from the following:    Height as of this encounter: 1.753 m (5' 9\").    Weight as of this encounter: 117.9 kg (259 lb 14.4 oz).  Meds Reconciled: complete    Michelle Harris RN     FOOD SECURITY SCREENING QUESTIONS  Hunger Vital Signs:  Within the past 12 months we worried whether our food would run out before we got money to buy more. Never  Within the past 12 months the food we bought just didn't last and we didn't have money to get more. Never  Michelle Harris RN 5/23/2023 12:03 PM    Michelle Harris RN  ....................  5/23/2023   12:03 PM          "

## 2023-05-23 NOTE — TELEPHONE ENCOUNTER
Patient states that he has a cough, I explained that we are booked today but could send him to scheduling to make appt or go to [facility]. He states that he will [facility] the [facility].  Lalita Estrella LPN ....................  5/23/2023   9:24 AM

## 2023-05-23 NOTE — PROGRESS NOTES
ASSESSMENT/PLAN:    I have reviewed the nursing notes.  I have reviewed the findings, diagnosis, plan and need for follow up with the patient.    1. Pneumonia of right middle lobe due to infectious organism  2. Acute cough  - azithromycin (ZITHROMAX) 250 MG tablet; Take 2 tablets (500 mg) by mouth daily for 1 day, THEN 1 tablet (250 mg) daily for 4 days.  Dispense: 6 tablet; Refill: 0  - amoxicillin (AMOXIL) 500 MG capsule; Take 2 capsules (1,000 mg) by mouth 3 times daily for 5 days  Dispense: 30 capsule; Refill: 0  - XR Chest 2 Views  - Symptomatic Influenza A/B, RSV, & SARS-CoV2 PCR (COVID-19) Nose  - benzonatate (TESSALON) 100 MG capsule; Take 1 capsule (100 mg) by mouth 3 times daily as needed for cough  Dispense: 30 capsule; Refill: 0    Patient presents with upper respiratory symptoms.  Patient's vitals are stable and he appears nontoxic.  He has had a dry cough for the past 6 months but it got worse in the last week.  Chest x-ray shows subtle infiltrates in the right middle lung.  Multiplex was negative.  Will treat patient for pneumonia with amoxicillin and azithromycin.  Also gave patient a prescription for Tessalon Perles to take as needed for his cough. Discussed symptomatic treatment - Encouraged fluids, salt water gargles, honey, elevation, humidifier, sinus rinse/netti pot, lozenges, tea, topical vapor rub, popsicles, rest, etc. May use over-the-counter Tylenol or ibuprofen PRN.  Advised patient that he should follow-up with his primary care provider regarding his persisting cough for the past 6 months as it could be related to his lisinopril as the cough started shortly after he began taking this medication in November.    Discussed warning signs/symptoms indicative of need to f/u    Follow up if symptoms persist or worsen or concerns    I explained my diagnostic considerations and recommendations to the patient, who voiced understanding and agreement with the treatment plan. All questions were  answered. We discussed potential side effects of any prescribed or recommended therapies, as well as expectations for response to treatments.    Jan Samuel, OLGA CNP  5/23/2023  12:08 PM    HPI:    Greg Mckenna is a 43 year old male  who [facility] for concerns of URI symptoms    URI, x 1 week    Symptoms:  No fevers or chills.   No sore throat/pharyngitis/tonsillitis.   YES: + allergy/URI Symptoms  No muffled sounds/change in hearing  No sensation of fullness in ear(s)  No ringing in ears/tinnitus  No balance changes  No dizziness  YES: + congestion (head/nasal/chest)  YES: + cough/productive cough  YES: + post nasal drip   No headache  YES: + sinus pain/pressure  YES: + myalgias  No otalgia  No rash  Activity Level Changes: Yes: increased fatigue  Appetite/Liquid Intake Changes: No  Changes to Bowel Habits: No  Changes to Bladder Habits: No  Additional Symptoms to Report: Yes: shortness of breath, wheezing  History of similar symptoms: No  Prior workup: No    Treatments tried: Fluids, Rest and cough drops    Site of exposure: not known.  Type of exposure: not known    Other Pertinent History: started in November when he started on Lisinopril but has gotten worse over the past week    Allergies: NKA    PCP: Zuleika Holguin    Past Medical History:   Diagnosis Date     Chronic sinusitis     No Comments Provided     Epididymitis     No Comments Provided     Personal history of other diseases of the female genital tract     Premature, born @ 23 weeks     Past Surgical History:   Procedure Laterality Date     ADENOIDECTOMY      x2     OTHER SURGICAL HISTORY      214956,OTHER,x2     OTHER SURGICAL HISTORY      [id number],OTHER     TONSILLECTOMY      No Comments Provided     Social History     Tobacco Use     Smoking status: Never     Smokeless tobacco: Never   Vaping Use     Vaping status: Never Used   Substance Use Topics     Alcohol use: Yes     Alcohol/week: 14.0 standard drinks of alcohol      "Types: 14 Cans of beer per week     Comment: 2 beers a day     Current Outpatient Medications   Medication Sig Dispense Refill     amLODIPine (NORVASC) 10 MG tablet Take 1 tablet by mouth daily       budesonide (PULMICORT) 0.5 MG/2ML neb solution Squirt entire vial into reji med saline solution, mix, and irrigate each nostril until entire bottle empty.  Do this twice daily. 200 mL 11     dexamethasone (DECADRON) 1 MG tablet Take 1 tablet (1 mg) by mouth 2 times daily (with meals) 1 tablet 0     fluticasone (FLONASE) 50 MCG/ACT nasal spray Spray 2 sprays into both nostrils daily 16 g 12     lisinopril (ZESTRIL) 20 MG tablet Take 1 tablet by mouth daily       rosuvastatin (CRESTOR) 10 MG tablet Take 1 tablet (10 mg) by mouth every evening 90 tablet 3     albuterol (PROAIR HFA/PROVENTIL HFA/VENTOLIN HFA) 108 (90 Base) MCG/ACT inhaler Inhale 2 puffs into the lungs every 4 hours as needed for shortness of breath / dyspnea or wheezing (Patient not taking: Reported on 5/23/2023) 8.5 g 0     omeprazole (PRILOSEC) 20 MG DR capsule Take 1 capsule (20 mg) by mouth daily (Patient not taking: Reported on 5/23/2023) 30 capsule 2     No Known Allergies  Past medical history, past surgical history, current medications and allergies reviewed and accurate to the best of my knowledge.      ROS:  Refer to HPI    /79 (BP Location: Right arm, Patient Position: Sitting, Cuff Size: Adult Large)   Pulse 85   Temp 98.4  F (36.9  C) (Tympanic)   Resp 16   Ht 1.753 m (5' 9\")   Wt 117.9 kg (259 lb 14.4 oz)   SpO2 95%   BMI 38.38 kg/m      EXAM:  General Appearance: Well appearing 43 year old male, appropriate appearance for age. No acute distress   Ears: Left TM intact, translucent with bony landmarks appreciated, no erythema, no effusion, no bulging, no purulence.  Right TM intact, translucent with bony landmarks appreciated, no erythema, no effusion, no bulging, no purulence.  Left auditory canal clear.  Right auditory canal " clear.  Normal external ears, non tender.  Eyes: conjunctivae normal without erythema or irritation, corneas clear, no drainage or crusting, no eyelid swelling, pupils equal   Oropharynx: moist mucous membranes, posterior pharynx without erythema, tonsils symmetric and 1+, no erythema, no exudates or petechiae, no post nasal drip seen, no trismus, voice clear.    Sinuses:  No sinus tenderness upon palpation of the frontal or maxillary sinuses  Nose:  Bilateral nares: no erythema, no edema, no drainage or congestion   Neck: supple without adenopathy  Respiratory: normal chest wall and respirations.  Normal effort.  Mild wheezing in right upper and lower lobe, no crackles or rhonchi.  No increased work of breathing.  Mild dry cough appreciated.  Cardiac: RRR with no murmurs  Musculoskeletal:  Equal movement of bilateral upper extremities.  Equal movement of bilateral lower extremities.  Normal gait.    Dermatological: no rashes noted of exposed skin  Neuro: Alert and oriented to person, place, and time. Psychological: normal affect, alert, oriented, and pleasant.     Labs & Xray:  Results for orders placed or performed in visit on 05/23/23   XR Chest 2 Views     Status: None    Narrative    PROCEDURE:  XR CHEST 2 VIEWS    HISTORY: Acute cough, .    COMPARISON:  9/23/2019    FINDINGS:  The cardiomediastinal contours are normal. The trachea is midline.  Subtle infiltrate is questioned projecting over the right midlung on  the frontal view. No effusion or pneumothorax.    No suspicious osseous lesion or subdiaphragmatic free air.      Impression    IMPRESSION:      Question subtle right lung infiltrate.    FRANK CAVANAUGH MD         SYSTEM ID:  [id number]   Symptomatic Influenza A/B, RSV, & SARS-CoV2 PCR (COVID-19) Nose     Status: Normal    Specimen: Nose; Swab   Result Value Ref Range    Influenza A PCR Negative Negative    Influenza B PCR Negative Negative    RSV PCR Negative Negative    SARS CoV2 PCR Negative  Negative    Narrative    Testing was performed using the Xpert Xpress CoV2/Flu/RSV Assay on the [employer] GeneXpert Instrument. This test should be ordered for the detection of SARS-CoV-2, influenza, and RSV viruses in individuals who meet clinical and/or epidemiological criteria. Test performance is unknown in asymptomatic patients. This test is for in vitro diagnostic use under the FDA EUA for laboratories certified under CLIA to perform high or moderate complexity testing. This test has not been FDA cleared or approved. A negative result does not rule out the presence of PCR inhibitors in the specimen or target RNA in concentration below the limit of detection for the assay. If only one viral target is positive but coinfection with multiple targets is suspected, the sample should be re-tested with another FDA cleared, approved, or authorized test, if coinfection would change clinical management. This test was validated by the [facility] [employer]. These laboratories are certified under the Clinical Laboratory Improvement Amendments of 1988 (CLIA-88) as qualified to perform high complexity laboratory testing.

## 2023-05-24 ENCOUNTER — LAB (OUTPATIENT)
Dept: LAB | Facility: OTHER | Age: 43
End: 2023-05-24
Attending: PHYSICIAN ASSISTANT
Payer: COMMERCIAL

## 2023-05-24 DIAGNOSIS — E66.812 CLASS 2 OBESITY WITHOUT SERIOUS COMORBIDITY WITH BODY MASS INDEX (BMI) OF 37.0 TO 37.9 IN ADULT, UNSPECIFIED OBESITY TYPE: ICD-10-CM

## 2023-05-24 DIAGNOSIS — R53.83 FATIGUE, UNSPECIFIED TYPE: ICD-10-CM

## 2023-05-24 DIAGNOSIS — R68.89 CUSHINGOID FACIES: ICD-10-CM

## 2023-05-24 LAB — CORTIS SERPL-MCNC: 0.8 UG/DL

## 2023-05-24 PROCEDURE — 36415 COLL VENOUS BLD VENIPUNCTURE: CPT | Mod: ZL

## 2023-05-24 PROCEDURE — 82533 TOTAL CORTISOL: CPT | Mod: ZL

## 2023-05-30 LAB — SCANNED LAB RESULT: NORMAL

## 2023-06-01 ENCOUNTER — TELEPHONE (OUTPATIENT)
Dept: OTOLARYNGOLOGY | Facility: OTHER | Age: 43
End: 2023-06-01

## 2023-06-01 NOTE — TELEPHONE ENCOUNTER
The patient had a recent allergy blood test completed. Per Dr. Ordaz, he had a mild dust allergy, otherwise negative. The patient was notified of these results.

## 2023-06-07 ENCOUNTER — VIRTUAL VISIT (OUTPATIENT)
Dept: SLEEP MEDICINE | Facility: HOSPITAL | Age: 43
End: 2023-06-07
Attending: FAMILY MEDICINE
Payer: COMMERCIAL

## 2023-06-07 DIAGNOSIS — G47.33 OSA (OBSTRUCTIVE SLEEP APNEA): Primary | ICD-10-CM

## 2023-06-07 NOTE — PROGRESS NOTES
Patient was provided both verbal and written education and instructions on use of Watch PAT device. Watch PAT device has been registered and shipped via [employer] on 6/7/2023. Patient was notified that package was mailed out. Watch PAT serial number: [id number]    Tracking # [id number].

## 2023-06-16 ENCOUNTER — DOCUMENTATION ONLY (OUTPATIENT)
Dept: SLEEP MEDICINE | Facility: HOSPITAL | Age: 43
End: 2023-06-16
Attending: FAMILY MEDICINE
Payer: COMMERCIAL

## 2023-06-16 DIAGNOSIS — G47.33 OSA (OBSTRUCTIVE SLEEP APNEA): ICD-10-CM

## 2023-06-16 DIAGNOSIS — G47.33 OSA (OBSTRUCTIVE SLEEP APNEA): Primary | ICD-10-CM

## 2023-06-16 PROCEDURE — 95800 SLP STDY UNATTENDED: CPT

## 2023-06-16 PROCEDURE — 95800 SLP STDY UNATTENDED: CPT | Mod: 26

## 2023-06-16 NOTE — PROGRESS NOTES
WatchPAT data has been received and has been scored using rule 1B, 4%. Patient to follow up with provider to determine appropriate therapy.    Pat AHI: 83.3    Ordering Provider: Dr Jesus Simeon      Sleep Technician/Technologist: Nikia DUCKWORTH

## 2023-06-16 NOTE — PROCEDURES
"WatchPAT - HOME SLEEP STUDY INTERPRETATION    Patient: Greg Mckenna  MRN: [id number]  YOB: 1980  Study Date: 2023  Referring Provider: Zuleika Holguin  Ordering Provider: Jesus Simeon MD, MD    Chain of custody patient verification was not enabled.       Indications for Home Study: Greg Mckenna is a 43 year old male with a history of spinal stenosis, obesity, depression  who presents with symptoms suggestive of obstructive sleep apnea.    Estimated body mass index is 38.38 kg/m  as calculated from the following:    Height as of 23: 1.753 m (5' 9\").    Weight as of 23: 117.9 kg (259 lb 14.4 oz).  [place] Sleepiness Scale:   STOP-BAN/8    Data: A full night home sleep study was performed recording the standard physiologic parameters including peripheral arterial tonometry (PAT), sound/snoring, body position,  movement, sound, and oxygen saturation by pulse oximetry. Pulse rate was estimated by oximetry recording. Sleep staging (wake, REM, light, and deep sleep) was derived from PAT signal.  This study was considered adequate based on > 4 hours of quality oximetry and respiratory recording. As specified by the AASM Manual for the Scoring of Sleep and Associated events, version 2.3, Rule VIII.D 1B, 4% oxygen desaturation scoring for hypopneas is used as a standard of care on all home sleep apnea testing.    Total Recording Time: 6 hrs, 43 min  Total Sleep Time: 6 hrs, 10 min  % of Sleep Time REM: 26.1%    Respiratory:  Snoring: Snoring was present.  Respiratory events: The PAT respiratory disturbance index [pRDI] was 83.3 events per hour.  The PAT apnea/hypopnea index [pAHI] was 83.3 events per hour.  BASIM was 82.6 events per hour.  During REM sleep the pAHI was 74.6.  Sleep Associated Hypoxemia: sustained hypoxemia was present briefly in two of four REM periods, otherwise SpO2 typically normalized between frequent events. Mean oxygen saturation was 87%.  Minimum was " 63%.  Time with saturation less than 88% was 207.4 minutes.    Heart Rate: By pulse oximetry normal rate was noted.     Position: Percent of time spent: supine - 42.5%, prone - 8.9%, on right - 48.5%, on left - 0%.  pAHI was 87.3 per hour supine, 93.2 per hour prone, 78 per hour on right side, and - per hour on left side.     Assessment:   Severe obstructive sleep apnea.  Sleep associated hypoxemia was present.  sustained hypoxemia was present briefly in two of four REM periods, otherwise SpO2 typically normalized between frequent events    Recommendations:  Consider auto-CPAP at 5-15 cmH2O or polysomnography with full night PAP titration.  Suggest optimizing sleep hygiene and avoiding sleep deprivation.  Weight management.    Diagnosis Code(s): Obstructive Sleep Apnea G47.33, Hypoxemia G47.36    Jesus Simeon MD, MD, June 16, 2023   Diplomate, American Board of Family Medicine, Sleep Medicine

## 2023-06-19 ENCOUNTER — OFFICE VISIT (OUTPATIENT)
Dept: OTOLARYNGOLOGY | Facility: OTHER | Age: 43
End: 2023-06-19
Attending: OTOLARYNGOLOGY
Payer: COMMERCIAL

## 2023-06-19 VITALS
HEART RATE: 87 BPM | DIASTOLIC BLOOD PRESSURE: 68 MMHG | SYSTOLIC BLOOD PRESSURE: 118 MMHG | BODY MASS INDEX: 35.79 KG/M2 | TEMPERATURE: 97.6 F | WEIGHT: 250 LBS | HEIGHT: 70 IN | OXYGEN SATURATION: 96 %

## 2023-06-19 DIAGNOSIS — G47.33 OSA (OBSTRUCTIVE SLEEP APNEA): ICD-10-CM

## 2023-06-19 DIAGNOSIS — J34.89 CONCHA BULLOSA: ICD-10-CM

## 2023-06-19 DIAGNOSIS — J34.3 NASAL TURBINATE HYPERTROPHY: Primary | ICD-10-CM

## 2023-06-19 DIAGNOSIS — J34.2 DNS (DEVIATED NASAL SEPTUM): ICD-10-CM

## 2023-06-19 DIAGNOSIS — J32.0 CHRONIC MAXILLARY SINUSITIS: ICD-10-CM

## 2023-06-19 PROCEDURE — 99214 OFFICE O/P EST MOD 30 MIN: CPT | Performed by: OTOLARYNGOLOGY

## 2023-06-19 RX ORDER — BUDESONIDE 0.5 MG/2ML
INHALANT ORAL
Qty: 200 ML | Refills: 11 | Status: SHIPPED | OUTPATIENT
Start: 2023-06-19 | End: 2023-12-07

## 2023-06-19 ASSESSMENT — PAIN SCALES - GENERAL: PAINLEVEL: MILD PAIN (2)

## 2023-06-19 NOTE — LETTER
"    6/19/2023         RE: Greg Mckenna  [address] [place] [address]        Dear Colleague,    Thank you for referring your patient, Greg Mckenna, to the [facility]. Please see a copy of my visit note below.    Otolaryngology Progress Note          Greg Mckenna is a 43 year old male  Follow-up of deviated nasal septum and turbinate hypertrophy with concerns for obstructive sleep apnea who is referred for a sleep study and presents for follow-up after CT sinus and serum specific IgE he was started on Flonase and budesonide irrigations at his last visit.  Has not started budesonide irrigations and is not taking the Flonase    CT sinus dated 5/17/2023 shows clear sinuses throughout with aplastic frontal sinuses.  There is a left septal deviation with a mid to superior spur.  It appears he has had prior septoplasty with the quadrangular cartilage displaced off the maxillary crest, mucoperiosteal thickening left worse than right maxillary sinus    BMI 38       SNOT 35 on 5/12 with a problem with nasal blockage , severe problem with lack of a good night sleep waking up tired fatigue, reduced productivity    Frequent maxillary pressure    Sleep study note by Dr. Richardson dated 6/16/2023 shows severe obstructive sleep apnea with apnea hypopnea index of 83.3 and hypoxemia and sustained hypoxemia with reinier of 63%.    His assessment was severe obstructive sleep apnea with sleep associated hypoxemia.  He is started on auto CPAP weight management was discussed.    Since his last visit, continued left worse than right nasal obstruction    serum specific IgE showed class 2 dust allergy, o/w negative    No prior nasal surgery.  2 episodes of severe nasal trauma without surgical repair      Physical Exam  /68 (BP Location: Left arm, Cuff Size: Adult Regular)   Pulse 87   Temp 97.6  F (36.4  C) (Tympanic)   Ht 1.778 m (5' 10\")   Wt 113.4 kg (250 lb)   SpO2 96%   BMI 35.87 kg/m    General " - The patient is well nourished and well developed, and appears to have good nutritional status.  Alert and oriented to person and place, interactive.  Head and Face - Normocephalic and atraumatic, with no gross asymmetry noted of the contour of the facial features.  The facial nerve is intact, with strong symmetric movements.  Eyes - Extraocular movements intact.   Nose - Nasal mucosa is pink and moist with no abnormal mucus.     Impression/Plan  Greg Mckenna is a 43 year old male    ICD-10-CM    1. Nasal turbinate hypertrophy  J34.3 budesonide (PULMICORT) 0.5 MG/2ML neb solution      2. 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 Chronic maxillary sinusitis  J32.0       5. severe ELVIRA (obstructive sleep apnea) with hypoxemia   G47.33         I again encouraged budesonide irrigations.  Start CPAP  He is awaiting equipment    Contact us if nasal obstruction is persistent or worsens    Risks and complications of bilateral septoplasty, turbinate reduction, endoscopic sinus surgery were discussed include general anesthesia, bleeding, infection, septal perforation, anosmia, epiphora, CSF rhinorrhea, atrophic rhinitis, scar formation, numbness over the incision, need for additional surgery and no guarantee is made that the septum will be completely straight.  Understanding is expressed, all questions are answered and wishes are to proceed with surgical intervention.    Bilateral maxillary antrostomy, possible anterior ethmoidectomy  abdoulaye  Possible propel    Melissa Ordaz D.O.  Otolaryngology/Head and Neck Surgery  Allergy              Again, thank you for allowing me to participate in the care of your patient.        Sincerely,        Melissa Ordaz MD

## 2023-06-19 NOTE — PATIENT INSTRUCTIONS
Thank you for allowing Dr. Ordaz and our ENT team to participate in your care.  If your medications are too expensive, please give the nurse a call.  We can possibly change this medication.  If you have a scheduling or an appointment question please contact our Health Unit Coordinator at their direct line [phone].   ALL nursing questions or concerns can be directed to your ENT nurse at: [phone]     Use Budesonide Rinses Twice Daily for 2 months, then as needed    Consider Septoplasty, Turbinate Reduction, Maxillary Antrostomies    [facility] [place] [facility]: [phone]    Budesonide instructions  Make the Justin Med Saline Solution using 2 packages of salt and previously boiled or distilled water.  This will make 240 ml of saline solution.  Mix the entire vial of budesonide into the solution.   Irrigate your nose 2 times a day with the warm budesonide solution using 1 bottle between nostrils in the morning, and one bottle at night.

## 2023-06-19 NOTE — PROGRESS NOTES
"Otolaryngology Progress Note          Greg Mckenna is a 43 year old male  Follow-up of deviated nasal septum and turbinate hypertrophy with concerns for obstructive sleep apnea who is referred for a sleep study and presents for follow-up after CT sinus and serum specific IgE he was started on Flonase and budesonide irrigations at his last visit.  Has not started budesonide irrigations and is not taking the Flonase    CT sinus dated 5/17/2023 shows clear sinuses throughout with aplastic frontal sinuses.  There is a left septal deviation with a mid to superior spur.  It appears he has had prior septoplasty with the quadrangular cartilage displaced off the maxillary crest, mucoperiosteal thickening left worse than right maxillary sinus    BMI 38       SNOT 35 on 5/12 with a problem with nasal blockage , severe problem with lack of a good night sleep waking up tired fatigue, reduced productivity    Frequent maxillary pressure    Sleep study note by Dr. Richardson dated 6/16/2023 shows severe obstructive sleep apnea with apnea hypopnea index of 83.3 and hypoxemia and sustained hypoxemia with reinier of 63%.    His assessment was severe obstructive sleep apnea with sleep associated hypoxemia.  He is started on auto CPAP weight management was discussed.    Since his last visit, continued left worse than right nasal obstruction    serum specific IgE showed class 2 dust allergy, o/w negative    No prior nasal surgery.  2 episodes of severe nasal trauma without surgical repair      Physical Exam  /68 (BP Location: Left arm, Cuff Size: Adult Regular)   Pulse 87   Temp 97.6  F (36.4  C) (Tympanic)   Ht 1.778 m (5' 10\")   Wt 113.4 kg (250 lb)   SpO2 96%   BMI 35.87 kg/m    General - The patient is well nourished and well developed, and appears to have good nutritional status.  Alert and oriented to person and place, interactive.  Head and Face - Normocephalic and atraumatic, with no gross asymmetry noted of the contour " of the facial features.  The facial nerve is intact, with strong symmetric movements.  Eyes - Extraocular movements intact.   Nose - Nasal mucosa is pink and moist with no abnormal mucus.     Impression/Plan  Greg Mckenna is a 43 year old male    ICD-10-CM    1. Nasal turbinate hypertrophy  J34.3 budesonide (PULMICORT) 0.5 MG/2ML neb solution      2. 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 Chronic maxillary sinusitis  J32.0       5. severe ELVIRA (obstructive sleep apnea) with hypoxemia   G47.33         I again encouraged budesonide irrigations.  Start CPAP  He is awaiting equipment    Contact us if nasal obstruction is persistent or worsens    Risks and complications of bilateral septoplasty, turbinate reduction, endoscopic sinus surgery were discussed include general anesthesia, bleeding, infection, septal perforation, anosmia, epiphora, CSF rhinorrhea, atrophic rhinitis, scar formation, numbness over the incision, need for additional surgery and no guarantee is made that the septum will be completely straight.  Understanding is expressed, all questions are answered and wishes are to proceed with surgical intervention.    Bilateral maxillary antrostomy, possible anterior ethmoidectomy  abdoulaye  Possible propel    Melissa Ordaz D.O.  Otolaryngology/Head and Neck Surgery  Allergy

## 2023-06-27 NOTE — PROGRESS NOTES
Virtual Visit Details    Type of service:  Telephone Visit   Phone call duration: 24 minutes     Greg Mckenna is a 43 year old male who is being evaluated via a billable telephone visit.       What phone number would you like to be contacted at?[email or web address]@ [phone]  Home Phone [phone]   Work Phone Not on file.   Mobile [phone]       How would you like to obtain your AVS? [place]       Telephone Virtual Visit Details     Type of service:  Telephone Virtual Visit     Originating Location (pt. Location): Home     Distant Location (provider location):  Off-site, [facility] [place]      Start Time:  1118  End Time: 11:36 AM    Virtual visit for review of WatchPAT home sleep testing.     A/P:     1.)  Severe ELVIRA (pAHI 83.3) sustained hypoxemia was present briefly in two of four REM periods, otherwise SpO2 typically normalized between frequent events. Mean oxygen saturation was 87%.  Minimum was 63%.  Time with saturation less than 88% was 207.4 minutes.   - We discussed the treatment options of starting with CPAP auto titrate 5-15 cm H2O with follow-up overnight oximetry on CPAP versus all-night PAP titration sleep study.  - She is very motivated to start treatment, so we agreed to start with CPAP auto titrate 5-15 cm H2O and plan for likely repeat overnight oximetry on CPAP in the next 1-2 months.     2.)  Potential abnormal nocturnal behaviors  - Presumed confusional arousals 2/2 severe ELVIRA, but can't rule out REM without atonia.  - Overall, my primary suspicion is that these are a form of confusional arousal and would expect potential improvement with treatment of severe ELVIRA.    SUBJECTIVE:  Greg Mckenna is a 43 year old male.    43 year old yo male who is referred for sleep-disordered breathing, recurrent nightmares.     Pertinent PMHx of spinal stenosis, obesity, depression.     STOP-BANG score of 7, with unknown neck circumference.  Beason score of 5.  BMI of Estimated body mass index  "is 37.74 kg/m  as calculated from the following:    Height as of 4/4/23: 1.778 m (5' 10\").    Weight as of 4/11/23: 119.3 kg (263 lb).      Today - We reviewed his sleep test results in detail.  We tried to discuss his nighttime behaviors in more detail, though he is not always aware when these occurred and are largely reported to him by his girlfriend.  He seems to describe these as he recalls dying in his dream and he will wake up and seem frightened or flail around..    Per questionnaire: \"I have recurring nightmares and can't get sleep. Believe have sleep apnea. Snore loud, stop breathing, have deviated septum as well.\"     Sxs for 1+ years, no prior sleep testing.     Caffeine use:  No for 3+ per day.  No for within 6 hours of bed.     Tobacco use: No     Sleep pattern:  11:30pm - ?, total sleep time 5-6 hours.  Time to fall asleep: ~few minutes.  Awakenings: 3-5 times per night, \"varies\" minutes to return to sleep, awake for total of \"hours\" per night.  Napping.  5-7 days per week, 1 hours per nap.     Yes for RLS screen.  No for sleep walking.  Yes for dream enactment behavior.  Yes for bruxism.     Yes for morning headaches.  Yes for snoring.  Yes for observed apnea.  No for FHx of ELVIRA.     SHx:  , not currently working.    WatchPAT - HOME SLEEP STUDY INTERPRETATION     Patient: Greg Mckenna  MRN: [id number]  YOB: 1980  Study Date: 6/16/2023  Referring Provider: Zuleika Holguin  Ordering Provider: Jesus Simeon MD, MD     Chain of custody patient verification was not enabled.       Indications for Home Study: Greg Mckenna is a 43 year old male with a history of spinal stenosis, obesity, depression  who presents with symptoms suggestive of obstructive sleep apnea.     Estimated body mass index is 38.38 kg/m  as calculated from the following:    Height as of 5/23/23: 1.753 m (5' 9\").    Weight as of 5/23/23: 117.9 kg (259 lb 14.4 oz).  Vega Sleepiness Scale: "   STOP-BAN/8     Data: A full night home sleep study was performed recording the standard physiologic parameters including peripheral arterial tonometry (PAT), sound/snoring, body position,  movement, sound, and oxygen saturation by pulse oximetry. Pulse rate was estimated by oximetry recording. Sleep staging (wake, REM, light, and deep sleep) was derived from PAT signal.  This study was considered adequate based on > 4 hours of quality oximetry and respiratory recording. As specified by the AASM Manual for the Scoring of Sleep and Associated events, version 2.3, Rule VIII.D 1B, 4% oxygen desaturation scoring for hypopneas is used as a standard of care on all home sleep apnea testing.     Total Recording Time: 6 hrs, 43 min  Total Sleep Time: 6 hrs, 10 min  % of Sleep Time REM: 26.1%     Respiratory:  Snoring: Snoring was present.  Respiratory events: The PAT respiratory disturbance index [pRDI] was 83.3 events per hour.  The PAT apnea/hypopnea index [pAHI] was 83.3 events per hour.  BASIM was 82.6 events per hour.  During REM sleep the pAHI was 74.6.  Sleep Associated Hypoxemia: sustained hypoxemia was present briefly in two of four REM periods, otherwise SpO2 typically normalized between frequent events. Mean oxygen saturation was 87%.  Minimum was 63%.  Time with saturation less than 88% was 207.4 minutes.     Heart Rate: By pulse oximetry normal rate was noted.      Position: Percent of time spent: supine - 42.5%, prone - 8.9%, on right - 48.5%, on left - 0%.  pAHI was 87.3 per hour supine, 93.2 per hour prone, 78 per hour on right side, and - per hour on left side.      Assessment:   Severe obstructive sleep apnea.  Sleep associated hypoxemia was present.  sustained hypoxemia was present briefly in two of four REM periods, otherwise SpO2 typically normalized between frequent events     Recommendations:  Consider auto-CPAP at 5-15 cmH2O or polysomnography with full night PAP titration.  Suggest optimizing  sleep hygiene and avoiding sleep deprivation.  Weight management.     Diagnosis Code(s): Obstructive Sleep Apnea G47.33, Hypoxemia G47.36     Jesus Simeon MD, MD, June 16, 2023   Diplomate, American Board of Family Medicine, Sleep Medicine    Past medical history:    Patient Active Problem List    Diagnosis Date Noted    Spinal stenosis of lumbosacral region 04/11/2023     Priority: Medium    Spinal stenosis in cervical region 04/11/2023     Priority: Medium    Bursitis 04/17/2018     Priority: Medium     Overview:   tenosynovistis right shoulder    Formatting of this note might be different from the original.  tenosynovistis right shoulder      Peptic ulcer disease 04/17/2018     Priority: Medium    Overweight 05/22/2012     Priority: Medium    Malaise and fatigue 05/22/2012     Priority: Medium    Major depressive disorder, single episode, moderate (H) 08/16/2011     Priority: Medium       10 point ROS of systems including Constitutional, Eyes, Respiratory, Cardiovascular, Gastroenterology, Genitourinary, Integumentary, Muscularskeletal, Psychiatric were all negative except for pertinent positives noted in my HPI.    Current Outpatient Medications   Medication Sig Dispense Refill    albuterol (PROAIR HFA/PROVENTIL HFA/VENTOLIN HFA) 108 (90 Base) MCG/ACT inhaler Inhale 2 puffs into the lungs every 4 hours as needed for shortness of breath / dyspnea or wheezing 8.5 g 0    amLODIPine (NORVASC) 10 MG tablet Take 1 tablet by mouth daily      benzonatate (TESSALON) 100 MG capsule Take 1 capsule (100 mg) by mouth 3 times daily as needed for cough 30 capsule 0    budesonide (PULMICORT) 0.5 MG/2ML neb solution Squirt entire vial into reji med saline solution, mix, and irrigate each nostril until entire bottle empty.  Do this twice daily. 200 mL 11    budesonide (PULMICORT) 0.5 MG/2ML neb solution Squirt entire vial into reji med saline solution, mix, and irrigate each nostril until entire bottle empty.  Do this  twice daily. (Patient not taking: Reported on 6/19/2023) 200 mL 11    dexamethasone (DECADRON) 1 MG tablet Take 1 tablet (1 mg) by mouth 2 times daily (with meals) (Patient not taking: Reported on 6/19/2023) 1 tablet 0    fluticasone (FLONASE) 50 MCG/ACT nasal spray Spray 2 sprays into both nostrils daily (Patient not taking: Reported on 6/19/2023) 16 g 12    lisinopril (ZESTRIL) 20 MG tablet Take 1 tablet by mouth daily      omeprazole (PRILOSEC) 20 MG DR capsule Take 1 capsule (20 mg) by mouth daily (Patient not taking: Reported on 5/23/2023) 30 capsule 2    rosuvastatin (CRESTOR) 10 MG tablet Take 1 tablet (10 mg) by mouth every evening 90 tablet 3       OBJECTIVE:  There were no vitals taken for this visit.    Physical Exam:  healthy, alert, and no distress  PSYCH: Alert and oriented times 3; coherent speech, normal   rate and volume, able to articulate logical thoughts, able   to abstract reason, no tangential thoughts, no hallucinations   or delusions  His affect is normal  RESP: No cough, no audible wheezing, able to talk in full sentences  Remainder of exam unable to be completed due to telephone visits    ---  This note was written with the assistance of the Dragon voice-dictation technology software. The final document, although reviewed, may contain errors. For corrections, please contact the office.    Total time spent preparing to see the patient, review of chart, obtaining history and physical examination, review of sleep testing, review of treatment options, education, discussion with patient and documenting in Epic / EMR was 25 minutes.  All time involved was spent on the day of service for the patient (the same day as the patient's appointment).    Jesus Simeon MD    Sleep Medicine  [place], [place]  Main Office: [phone]  [facility] [place], [place]  [address]  [facility], [address]  Schedule visits: [phone]  Main Office: [phone]  Fax: [phone]

## 2023-06-29 ENCOUNTER — VIRTUAL VISIT (OUTPATIENT)
Dept: PULMONOLOGY | Facility: OTHER | Age: 43
End: 2023-06-29
Attending: FAMILY MEDICINE
Payer: COMMERCIAL

## 2023-06-29 VITALS — WEIGHT: 255 LBS | HEIGHT: 70 IN | BODY MASS INDEX: 36.51 KG/M2

## 2023-06-29 DIAGNOSIS — G47.33 OSA (OBSTRUCTIVE SLEEP APNEA): Primary | ICD-10-CM

## 2023-06-29 PROCEDURE — 99213 OFFICE O/P EST LOW 20 MIN: CPT | Mod: 93 | Performed by: FAMILY MEDICINE

## 2023-06-29 ASSESSMENT — PAIN SCALES - GENERAL: PAINLEVEL: MILD PAIN (2)

## 2023-06-29 NOTE — NURSING NOTE
Is the patient currently in the state of [place]? YES    Visit mode:TELEPHONE    If the visit is dropped, the patient can be reconnected by: TELEPHONE VISIT: Phone number: [phone]    Will anyone else be joining the visit? NO      How would you like to obtain your AVS? MyChart    Are changes needed to the allergy or medication list? NO    Reason for visit: RECHECK    Has patient had flu shot for current/most recent flu season? If so, when? No

## 2023-07-05 ENCOUNTER — TELEPHONE (OUTPATIENT)
Dept: HOME HEALTH SERVICES | Facility: CLINIC | Age: 43
End: 2023-07-05
Payer: COMMERCIAL

## 2023-07-05 NOTE — TELEPHONE ENCOUNTER
Thank you for the update. Please let us know if there is anything you need once the patient is scheduled.

## 2023-07-25 ENCOUNTER — DOCUMENTATION ONLY (OUTPATIENT)
Dept: HOME HEALTH SERVICES | Facility: CLINIC | Age: 43
End: 2023-07-25
Payer: COMMERCIAL

## 2023-07-25 DIAGNOSIS — G47.33 OSA (OBSTRUCTIVE SLEEP APNEA): Primary | ICD-10-CM

## 2023-07-25 NOTE — PROGRESS NOTES
Patient was offered choice of vendor and chose [facility].  Patient Greg Mckenna was set up at [employer] [facility] [place] on July 25, 2023. Patient received a Resmed Airsense 11 Pressures were set at  5-15 .   Patient s ramp is off  and FLEX/EPR is Fulltime 2, .  Patient received a Resmed Mask name: Airfit   Full Face mask size Large, heated tubing and heated humidifier.  Patient has the following compliance requirements: none  Patient has a follow up on TBD with Dr. Simeon.    Gretchen Crowder

## 2023-07-26 DIAGNOSIS — G47.33 OSA (OBSTRUCTIVE SLEEP APNEA): Primary | ICD-10-CM

## 2023-11-27 ENCOUNTER — MYC MEDICAL ADVICE (OUTPATIENT)
Dept: FAMILY MEDICINE | Facility: OTHER | Age: 43
End: 2023-11-27
Payer: COMMERCIAL

## 2023-12-04 NOTE — PROGRESS NOTES
Assessment & Plan     1. Erectile dysfunction, unspecified erectile dysfunction type  Discussed obtaining evaluation with lab work to rule out underlying causes such as A1c, lipid, TSH, CBC, CMP, testosterone levels.  Patient deferred at this time.  Will trial Viagra as outlined below.  Follow-up as needed.  - sildenafil (VIAGRA) 50 MG tablet; Take 1 tablet (50 mg) by mouth daily as needed (impotence)  Dispense: 30 tablet; Refill: 0    2. Benign essential hypertension  Chronic, stable.  Refilled medications as above.  Will be using Viagra as needed, educated on possible side effects of this medication, patient with hypertension medications.  - lisinopril (ZESTRIL) 20 MG tablet; Take 1 tablet (20 mg) by mouth daily  Dispense: 90 tablet; Refill: 3  - amLODIPine (NORVASC) 10 MG tablet; Take 1 tablet (10 mg) by mouth daily  Dispense: 90 tablet; Refill: 3  - sildenafil (VIAGRA) 50 MG tablet; Take 1 tablet (50 mg) by mouth daily as needed (impotence)  Dispense: 30 tablet; Refill: 0    3. Class 2 severe obesity due to excess calories with serious comorbidity and body mass index (BMI) of 39.0 to 39.9 in adult (H)  Chronic, stable.    4. Special screening for malignant neoplasms, colon  - Colonoscopy Screening  Referral; Future      No follow-ups on file.    Zuleika Holguin PA-C  [facility] is a 43 year old, presenting for the following health issues:  No chief complaint on file.      History of Present Illness       Reason for visit:  See    He eats 0-1 servings of fruits and vegetables daily.He consumes 2 sweetened beverage(s) daily.He exercises with enough effort to increase his heart rate 9 or less minutes per day.  He exercises with enough effort to increase his heart rate 3 or less days per week.   He is taking medications regularly.    Here for evaluation of erectile dysfunction concerns.  Patient reports this is ongoing for several months.  Is having  difficulties with maintaining erection.  No other new changes, uneventful life change, stressful times, change in mental health.  Patient does have history of benign essential hypertension has been well-controlled with lisinopril and amlodipine.  Needing refills.  He is due for colon cancer screening.    PAST MEDICAL HISTORY:   Past Medical History:   Diagnosis Date    Chronic sinusitis     No Comments Provided    Epididymitis     No Comments Provided    Personal history of other diseases of the female genital tract     Premature, born @ 23 weeks       PAST SURGICAL HISTORY:   Past Surgical History:   Procedure Laterality Date    ADENOIDECTOMY      x2    OTHER SURGICAL HISTORY      [id number],OTHER,x2    OTHER SURGICAL HISTORY      [id number],OTHER    TONSILLECTOMY      No Comments Provided       FAMILY HISTORY:   Family History   Problem Relation Age of Onset    Hypertension Father         Hypertension    Other - See Comments Father         hypercholesterolemia/back problems    Other - See Comments Mother         MS    Diabetes Other         Diabetes,FH       SOCIAL HISTORY:   Social History     Tobacco Use    Smoking status: Never    Smokeless tobacco: Never   Substance Use Topics    Alcohol use: Yes     Alcohol/week: 14.0 standard drinks of alcohol     Types: 14 Cans of beer per week     Comment: 2 beers a day      No Known Allergies  Current Outpatient Medications   Medication    albuterol (PROAIR HFA/PROVENTIL HFA/VENTOLIN HFA) 108 (90 Base) MCG/ACT inhaler    amLODIPine (NORVASC) 10 MG tablet    benzonatate (TESSALON) 100 MG capsule    budesonide (PULMICORT) 0.5 MG/2ML neb solution    budesonide (PULMICORT) 0.5 MG/2ML neb solution    dexamethasone (DECADRON) 1 MG tablet    fluticasone (FLONASE) 50 MCG/ACT nasal spray    lisinopril (ZESTRIL) 20 MG tablet    omeprazole (PRILOSEC) 20 MG DR capsule    rosuvastatin (CRESTOR) 10 MG tablet     No current facility-administered medications for this visit.         Review of  Systems         Objective    There were no vitals taken for this visit.  There is no height or weight on file to calculate BMI.  Physical Exam   General: Pleasant, in no apparent distress.  Cardiovascular: Regular rate and rhythm with S1 equal to S2. No murmurs, friction rubs, or gallops.   Respiratory: Lungs are resonant and clear to auscultation bilaterally. No wheezes, crackles, or rhonchi.  Psych: Appropriate mood and affect.

## 2023-12-07 ENCOUNTER — OFFICE VISIT (OUTPATIENT)
Dept: FAMILY MEDICINE | Facility: OTHER | Age: 43
End: 2023-12-07
Attending: PHYSICIAN ASSISTANT
Payer: COMMERCIAL

## 2023-12-07 VITALS
BODY MASS INDEX: 39.28 KG/M2 | OXYGEN SATURATION: 98 % | TEMPERATURE: 98.3 F | RESPIRATION RATE: 14 BRPM | WEIGHT: 274.4 LBS | SYSTOLIC BLOOD PRESSURE: 138 MMHG | HEART RATE: 87 BPM | DIASTOLIC BLOOD PRESSURE: 76 MMHG | HEIGHT: 70 IN

## 2023-12-07 DIAGNOSIS — E66.01 CLASS 2 SEVERE OBESITY DUE TO EXCESS CALORIES WITH SERIOUS COMORBIDITY AND BODY MASS INDEX (BMI) OF 39.0 TO 39.9 IN ADULT (H): ICD-10-CM

## 2023-12-07 DIAGNOSIS — N52.9 ERECTILE DYSFUNCTION, UNSPECIFIED ERECTILE DYSFUNCTION TYPE: Primary | ICD-10-CM

## 2023-12-07 DIAGNOSIS — Z12.11 SPECIAL SCREENING FOR MALIGNANT NEOPLASMS, COLON: ICD-10-CM

## 2023-12-07 DIAGNOSIS — I10 BENIGN ESSENTIAL HYPERTENSION: ICD-10-CM

## 2023-12-07 DIAGNOSIS — E66.812 CLASS 2 SEVERE OBESITY DUE TO EXCESS CALORIES WITH SERIOUS COMORBIDITY AND BODY MASS INDEX (BMI) OF 39.0 TO 39.9 IN ADULT (H): ICD-10-CM

## 2023-12-07 PROBLEM — J45.909 ASTHMA: Status: ACTIVE | Noted: 2023-12-07

## 2023-12-07 PROCEDURE — 99214 OFFICE O/P EST MOD 30 MIN: CPT | Performed by: PHYSICIAN ASSISTANT

## 2023-12-07 RX ORDER — LISINOPRIL 20 MG/1
20 TABLET ORAL DAILY
Qty: 90 TABLET | Refills: 3 | Status: SHIPPED | OUTPATIENT
Start: 2023-12-07

## 2023-12-07 RX ORDER — AMLODIPINE BESYLATE 10 MG/1
10 TABLET ORAL DAILY
Qty: 90 TABLET | Refills: 3 | Status: SHIPPED | OUTPATIENT
Start: 2023-12-07

## 2023-12-07 RX ORDER — SILDENAFIL 50 MG/1
50 TABLET, FILM COATED ORAL DAILY PRN
Qty: 30 TABLET | Refills: 0 | Status: SHIPPED | OUTPATIENT
Start: 2023-12-07

## 2023-12-07 ASSESSMENT — PATIENT HEALTH QUESTIONNAIRE - PHQ9
SUM OF ALL RESPONSES TO PHQ QUESTIONS 1-9: 0
10. IF YOU CHECKED OFF ANY PROBLEMS, HOW DIFFICULT HAVE THESE PROBLEMS MADE IT FOR YOU TO DO YOUR WORK, TAKE CARE OF THINGS AT HOME, OR GET ALONG WITH OTHER PEOPLE: NOT DIFFICULT AT ALL
SUM OF ALL RESPONSES TO PHQ QUESTIONS 1-9: 0

## 2023-12-07 ASSESSMENT — PAIN SCALES - GENERAL: PAINLEVEL: MILD PAIN (2)

## 2023-12-07 NOTE — NURSING NOTE
Patient presents to clinic requesting viagra medication due to ED.  Lalita Estrella LPN ....................  12/7/2023   12:44 PM  EXT [id number]

## 2024-02-06 ENCOUNTER — OFFICE VISIT (OUTPATIENT)
Dept: FAMILY MEDICINE | Facility: OTHER | Age: 44
End: 2024-02-06
Attending: PHYSICIAN ASSISTANT
Payer: COMMERCIAL

## 2024-02-06 VITALS
SYSTOLIC BLOOD PRESSURE: 130 MMHG | TEMPERATURE: 98.1 F | BODY MASS INDEX: 39.37 KG/M2 | HEIGHT: 70 IN | WEIGHT: 275 LBS | HEART RATE: 104 BPM | OXYGEN SATURATION: 96 % | RESPIRATION RATE: 14 BRPM | DIASTOLIC BLOOD PRESSURE: 70 MMHG

## 2024-02-06 DIAGNOSIS — R60.0 BILATERAL LOWER EXTREMITY EDEMA: Primary | ICD-10-CM

## 2024-02-06 LAB
ALBUMIN SERPL BCG-MCNC: 4.2 G/DL (ref 3.5–5.2)
ALP SERPL-CCNC: 94 U/L (ref 40–150)
ALT SERPL W P-5'-P-CCNC: 27 U/L (ref 0–70)
ANION GAP SERPL CALCULATED.3IONS-SCNC: 24 MMOL/L (ref 7–15)
AST SERPL W P-5'-P-CCNC: 16 U/L (ref 0–45)
BASOPHILS # BLD AUTO: 0 10E3/UL (ref 0–0.2)
BASOPHILS NFR BLD AUTO: 0 %
BILIRUB SERPL-MCNC: 0.3 MG/DL
BUN SERPL-MCNC: 19.9 MG/DL (ref 6–20)
CALCIUM SERPL-MCNC: 9.3 MG/DL (ref 8.6–10)
CHLORIDE SERPL-SCNC: 102 MMOL/L (ref 98–107)
CREAT SERPL-MCNC: 1.32 MG/DL (ref 0.67–1.17)
DEPRECATED HCO3 PLAS-SCNC: 22 MMOL/L (ref 22–29)
EGFRCR SERPLBLD CKD-EPI 2021: 68 ML/MIN/1.73M2
EOSINOPHIL # BLD AUTO: 0.2 10E3/UL (ref 0–0.7)
EOSINOPHIL NFR BLD AUTO: 3 %
ERYTHROCYTE [DISTWIDTH] IN BLOOD BY AUTOMATED COUNT: 14.1 % (ref 10–15)
GLUCOSE SERPL-MCNC: 120 MG/DL (ref 70–99)
HBA1C MFR BLD: 5.9 % (ref 4–6.2)
HCT VFR BLD AUTO: 42 % (ref 40–53)
HGB BLD-MCNC: 13.6 G/DL (ref 13.3–17.7)
IMM GRANULOCYTES # BLD: 0 10E3/UL
IMM GRANULOCYTES NFR BLD: 0 %
LYMPHOCYTES # BLD AUTO: 1.8 10E3/UL (ref 0.8–5.3)
LYMPHOCYTES NFR BLD AUTO: 26 %
MCH RBC QN AUTO: 29.8 PG (ref 26.5–33)
MCHC RBC AUTO-ENTMCNC: 32.4 G/DL (ref 31.5–36.5)
MCV RBC AUTO: 92 FL (ref 78–100)
MONOCYTES # BLD AUTO: 0.7 10E3/UL (ref 0–1.3)
MONOCYTES NFR BLD AUTO: 10 %
NEUTROPHILS # BLD AUTO: 4.2 10E3/UL (ref 1.6–8.3)
NEUTROPHILS NFR BLD AUTO: 61 %
NRBC # BLD AUTO: 0 10E3/UL
NRBC BLD AUTO-RTO: 0 /100
NT-PROBNP SERPL-MCNC: 57 PG/ML (ref 0–450)
PLATELET # BLD AUTO: 186 10E3/UL (ref 150–450)
POTASSIUM SERPL-SCNC: 4.9 MMOL/L (ref 3.4–5.3)
PROT SERPL-MCNC: 7.7 G/DL (ref 6.4–8.3)
RBC # BLD AUTO: 4.56 10E6/UL (ref 4.4–5.9)
SODIUM SERPL-SCNC: 148 MMOL/L (ref 135–145)
TSH SERPL DL<=0.005 MIU/L-ACNC: 1.21 UIU/ML (ref 0.3–4.2)
WBC # BLD AUTO: 6.9 10E3/UL (ref 4–11)

## 2024-02-06 PROCEDURE — 85025 COMPLETE CBC W/AUTO DIFF WBC: CPT | Mod: ZL | Performed by: PHYSICIAN ASSISTANT

## 2024-02-06 PROCEDURE — 82374 ASSAY BLOOD CARBON DIOXIDE: CPT | Mod: ZL | Performed by: PHYSICIAN ASSISTANT

## 2024-02-06 PROCEDURE — 83036 HEMOGLOBIN GLYCOSYLATED A1C: CPT | Mod: ZL | Performed by: PHYSICIAN ASSISTANT

## 2024-02-06 PROCEDURE — 83880 ASSAY OF NATRIURETIC PEPTIDE: CPT | Mod: ZL | Performed by: PHYSICIAN ASSISTANT

## 2024-02-06 PROCEDURE — 36415 COLL VENOUS BLD VENIPUNCTURE: CPT | Mod: ZL | Performed by: PHYSICIAN ASSISTANT

## 2024-02-06 PROCEDURE — 82040 ASSAY OF SERUM ALBUMIN: CPT | Mod: ZL | Performed by: PHYSICIAN ASSISTANT

## 2024-02-06 PROCEDURE — 84443 ASSAY THYROID STIM HORMONE: CPT | Mod: ZL | Performed by: PHYSICIAN ASSISTANT

## 2024-02-06 PROCEDURE — 99213 OFFICE O/P EST LOW 20 MIN: CPT | Performed by: PHYSICIAN ASSISTANT

## 2024-02-06 RX ORDER — FUROSEMIDE 20 MG
20 TABLET ORAL 2 TIMES DAILY
Qty: 20 TABLET | Refills: 0 | Status: SHIPPED | OUTPATIENT
Start: 2024-02-06 | End: 2024-03-22

## 2024-02-06 SDOH — HEALTH STABILITY: PHYSICAL HEALTH: ON AVERAGE, HOW MANY DAYS PER WEEK DO YOU ENGAGE IN MODERATE TO STRENUOUS EXERCISE (LIKE A BRISK WALK)?: 1 DAY

## 2024-02-06 ASSESSMENT — PAIN SCALES - GENERAL: PAINLEVEL: MILD PAIN (2)

## 2024-02-06 ASSESSMENT — SOCIAL DETERMINANTS OF HEALTH (SDOH): HOW OFTEN DO YOU GET TOGETHER WITH FRIENDS OR RELATIVES?: TWICE A WEEK

## 2024-02-06 NOTE — NURSING NOTE
Patient presents to clinic for annual physical and lower leg swelling.  Lalita Estrella LPN ....................  2/6/2024   1:40 PM  EXT [id number]    
Spontaneous, unlabored and symmetrical

## 2024-02-06 NOTE — PROGRESS NOTES
Assessment & Plan     1. Bilateral lower extremity edema  Few week history of bilateral lower extremity edema.  2-3+ pitting edema in clinic.  Remainder of exam and vital stable.  Lab work pending as below.  Will notify with results.  Will likely need course of diuretics for symptomatic management.  Encouraged healthy lifestyle, physical activity, compression stockings and elevating legs above heart level, limiting sodium intake, good hydration.  Follow-up if symptoms persist or worsen.  - CBC and Differential; Future  - TSH Reflex GH; Future  - Comprehensive Metabolic Panel; Future  - Brain Natriuretic Peptide (BNP); Future  - Hemoglobin A1c; Future  - Hemoglobin A1c  - Brain Natriuretic Peptide (BNP)  - Comprehensive Metabolic Panel  - TSH Reflex GH  - CBC and Differential      No follow-ups on file.    Fernando Garza is a 43 year old, presenting for the following health issues:  Physical    HPI   Here for evaluation of bilateral lower extremity edema that developed a few weeks ago.  Patient continues on amlodipine and lisinopril for management of hypertension.  Has been well-controlled.  Has been on medications for quite some time.  Was recently seen in the clinic and prescribed lisinopril but reports he has not tried medication as of yet.  Does consume a fair amount of alcohol, not monitoring diet or physical activity.  Was recently on vacation.  Painful due to swelling.  No overlying skin changes, numbness or tingling, weakness.  No chest pain or pressure, palpitations, headedness, dizziness, syncope, headaches, vision changes, shortness of breath.    PAST MEDICAL HISTORY:   Past Medical History:   Diagnosis Date    Chronic sinusitis     No Comments Provided    Epididymitis     No Comments Provided    Personal history of other diseases of the female genital tract     Premature, born @ 23 weeks       PAST SURGICAL HISTORY:   Past Surgical History:   Procedure Laterality Date    ADENOIDECTOMY      x2     "OTHER SURGICAL HISTORY      814055,OTHER,x2    OTHER SURGICAL HISTORY      [id number],OTHER    TONSILLECTOMY      No Comments Provided       FAMILY HISTORY:   Family History   Problem Relation Age of Onset    Hypertension Father         Hypertension    Other - See Comments Father         hypercholesterolemia/back problems    Other - See Comments Mother         MS    Diabetes Other         Diabetes,FH       SOCIAL HISTORY:   Social History     Tobacco Use    Smoking status: Never    Smokeless tobacco: Never   Substance Use Topics    Alcohol use: Yes     Alcohol/week: 14.0 standard drinks of alcohol     Types: 14 Cans of beer per week     Comment: 2 beers a day      No Known Allergies  Current Outpatient Medications   Medication    albuterol (PROAIR HFA/PROVENTIL HFA/VENTOLIN HFA) 108 (90 Base) MCG/ACT inhaler    amLODIPine (NORVASC) 10 MG tablet    benzonatate (TESSALON) 100 MG capsule    budesonide (PULMICORT) 0.5 MG/2ML neb solution    lisinopril (ZESTRIL) 20 MG tablet    sildenafil (VIAGRA) 50 MG tablet     No current facility-administered medications for this visit.           Objective    /70   Pulse 104   Temp 98.1  F (36.7  C)   Resp 14   Ht 1.778 m (5' 10\")   Wt 124.7 kg (275 lb)   SpO2 96%   BMI 39.46 kg/m    Body mass index is 39.46 kg/m .  Physical Exam   General: Pleasant, in no apparent distress.  Eyes: Sclera are white and conjunctiva are clear bilaterally. Lacrimal apparatus free of erythema, edema, and discharge bilaterally.  Ears: External ears without erythema or edema. Tympanic membranes are pearly white and without erythema, scarring or perforations bilaterally. External auditory canals are free of foreign bodies, erythema, ulcers, and masses.  Nose: External nose is symmetrical and free of lesions and deformities. Mucosa is soft pink and without erythema, edema, bleeding, or exudate. No septal perforation or deviation.  Oropharynx: Oral mucosa is pink and without ulcers, nodules, and " white patches. Tongue is symmetrical, pink, and without masses or lesions. Pharynx is pink, symmetrical, and without lesions. Uvula is midline. Tonsils are pink, symmetrical, and without edema, ulcers, or exudates, and 1+ bilaterally.  Neck: No cervical lymphadenopathy on inspection and palpation.  Thyroid: Thyroid isthmus is palpable and midline. Lobes are palpable bilaterally but not enlarged.  Cardiovascular: Regular rate and rhythm with S1 equal to S2. No murmurs, friction rubs, or gallops.   Respiratory: Lungs are resonant and clear to auscultation bilaterally. No wheezes, crackles, or rhonchi.  Abdomen: Abdomen is non-distended and without bulging flanks, prominent venous markings, or ecchymosis. Active bowel sounds heard in all four quadrants. No tenderness to palpation in all four quadrants. No rebound tenderness or guarding. No palpable masses noted. No hepatosplenomegaly.  Musculoskeletal: Back is straight, no tenderness to palpation of paraspinal and paravertebral muscles. Full ROM of back, neck, upper and lower extremities.  Neurologic Exam: Non-focal, symmetric DTRs, normal gross motor, tone coordination and no visible tremor.  Skin: No jaundice, pallor, rashes, or lesions.  Psych: Appropriate mood and affect.    Results for orders placed or performed in visit on 02/06/24   Hemoglobin A1c     Status: Normal   Result Value Ref Range    Hemoglobin A1C 5.9 4.0 - 6.2 %   Comprehensive Metabolic Panel     Status: Abnormal   Result Value Ref Range    Sodium 148 (H) 135 - 145 mmol/L    Potassium 4.9 3.4 - 5.3 mmol/L    Carbon Dioxide (CO2) 22 22 - 29 mmol/L    Anion Gap 24 (H) 7 - 15 mmol/L    Urea Nitrogen 19.9 6.0 - 20.0 mg/dL    Creatinine 1.32 (H) 0.67 - 1.17 mg/dL    GFR Estimate 68 >60 mL/min/1.73m2    Calcium 9.3 8.6 - 10.0 mg/dL    Chloride 102 98 - 107 mmol/L    Glucose 120 (H) 70 - 99 mg/dL    Alkaline Phosphatase 94 40 - 150 U/L    AST 16 0 - 45 U/L    ALT 27 0 - 70 U/L    Protein Total 7.7 6.4 -  8.3 g/dL    Albumin 4.2 3.5 - 5.2 g/dL    Bilirubin Total 0.3 <=1.2 mg/dL   CBC with platelets and differential     Status: None   Result Value Ref Range    WBC Count 6.9 4.0 - 11.0 10e3/uL    RBC Count 4.56 4.40 - 5.90 10e6/uL    Hemoglobin 13.6 13.3 - 17.7 g/dL    Hematocrit 42.0 40.0 - 53.0 %    MCV 92 78 - 100 fL    MCH 29.8 26.5 - 33.0 pg    MCHC 32.4 31.5 - 36.5 g/dL    RDW 14.1 10.0 - 15.0 %    Platelet Count 186 150 - 450 10e3/uL    % Neutrophils 61 %    % Lymphocytes 26 %    % Monocytes 10 %    % Eosinophils 3 %    % Basophils 0 %    % Immature Granulocytes 0 %    NRBCs per 100 WBC 0 <1 /100    Absolute Neutrophils 4.2 1.6 - 8.3 10e3/uL    Absolute Lymphocytes 1.8 0.8 - 5.3 10e3/uL    Absolute Monocytes 0.7 0.0 - 1.3 10e3/uL    Absolute Eosinophils 0.2 0.0 - 0.7 10e3/uL    Absolute Basophils 0.0 0.0 - 0.2 10e3/uL    Absolute Immature Granulocytes 0.0 <=0.4 10e3/uL    Absolute NRBCs 0.0 10e3/uL   CBC and Differential     Status: None    Narrative    The following orders were created for panel order CBC and Differential.  Procedure                               Abnormality         Status                     ---------                               -----------         ------                     CBC with platelets and d...[[id number]]                      Final result                 Please view results for these tests on the individual orders.         Signed Electronically by: Zuleika Holguin PA-C

## 2024-02-15 ENCOUNTER — MYC MEDICAL ADVICE (OUTPATIENT)
Dept: FAMILY MEDICINE | Facility: OTHER | Age: 44
End: 2024-02-15
Payer: COMMERCIAL

## 2024-02-15 DIAGNOSIS — R60.0 BILATERAL LOWER EXTREMITY EDEMA: Primary | ICD-10-CM

## 2024-02-15 RX ORDER — FUROSEMIDE 40 MG
40 TABLET ORAL 2 TIMES DAILY
Qty: 20 TABLET | Refills: 0 | Status: SHIPPED | OUTPATIENT
Start: 2024-02-15 | End: 2024-03-18

## 2024-02-15 NOTE — TELEPHONE ENCOUNTER
Sent in increased dose. If still no improvement he should be seen for follow up and updated labs.   Zuleika Holguin PA-C on 2/15/2024 at 12:31 PM

## 2024-02-15 NOTE — TELEPHONE ENCOUNTER
Current lasix prescription.  LOV with Zuleika Holguin 2/6/24    Emmie Phan RN on 2/15/2024 at 12:01 PM

## 2024-03-03 ENCOUNTER — MYC MEDICAL ADVICE (OUTPATIENT)
Dept: FAMILY MEDICINE | Facility: OTHER | Age: 44
End: 2024-03-03
Payer: COMMERCIAL

## 2024-03-04 NOTE — TELEPHONE ENCOUNTER
2/6/2024  OV with Charlotte Hall for Bilat LE edema.  From note:        Carmina Hill RN on 3/4/2024 at 2:20 PM

## 2024-03-13 DIAGNOSIS — R60.0 BILATERAL LOWER EXTREMITY EDEMA: ICD-10-CM

## 2024-03-18 RX ORDER — FUROSEMIDE 40 MG
40 TABLET ORAL 2 TIMES DAILY
Qty: 20 TABLET | Refills: 0 | Status: SHIPPED | OUTPATIENT
Start: 2024-03-18 | End: 2024-03-18

## 2024-03-18 NOTE — TELEPHONE ENCOUNTER
[facility] sent Rx request for the following:             Requested Prescriptions   Pending Prescriptions Disp Refills    furosemide (LASIX) 40 MG tablet [Pharmacy Med Name: Furosemide 40 MG Oral Tablet] 20 tablet 0       Sig: Take 1 tablet by mouth twice daily      Last Prescription Date:   2/15/24  Last Fill Qty/Refills:         20, R-0    Last Office Visit:              2/6/24   Future Office visit:           none     Patient returned call. Stated the swelling has not gotten any better or worse since taking the lasix. Patient transferred to scheduling to set up appointment with PCP per PCP request. Will route to PCP for refill consideration on this medication.  Heike Victor RN on 3/18/2024 at 11:27 AM

## 2024-03-18 NOTE — TELEPHONE ENCOUNTER
"[facility] sent Rx request for the following:      Requested Prescriptions   Pending Prescriptions Disp Refills    furosemide (LASIX) 40 MG tablet [Pharmacy Med Name: Furosemide 40 MG Oral Tablet] 20 tablet 0     Sig: Take 1 tablet by mouth twice daily     Last Prescription Date:   2/15/24  Last Fill Qty/Refills:         20, R-0    Last Office Visit:              2/6/24   Future Office visit:           none    Left messge for patient to return call regarding if he's had improvement of lower extremity edema. Per PCP \"Sent in increased dose. If still no improvement he should be seen for follow up and updated labs.   Zuleika Holguin PA-C on 2/15/2024 at 12:31 PM\"    Will wait for return call and attempt to call patient again later.  Heike Victor RN on 3/18/2024 at 11:13 AM          "

## 2024-03-20 NOTE — PROGRESS NOTES
"  Assessment & Plan     1. Bilateral lower extremity edema  Persistent symptoms for the last 1 to 2 months.  Reviewed labs from previous as well as updated today.  GFR and creatinine slightly abnormal compared to baseline but stable compared to previous.  Sodium and other electrolytes have returned to normal.  Exam showing significant fluid retention to bilateral lower extremity edema is without significant overlying skin changes.  Vitals and remainder of exam stable.  Prescription for furosemide at higher dose to be used as ordered below.  Recheck in 3 to 4 weeks.  Consider alternative to amlodipine if minimal improvement as could be side effects to this as well.  Again discussed significant importance of healthy lifestyle including healthy diet, limiting sodium intake, physical activity, compression stockings, elevating legs throughout the day, limiting alcohol intake.  If minimal improvement consider cardiac evaluation including echocardiogram.  - Basic Metabolic Panel; Future  - Basic Metabolic Panel  - furosemide (LASIX) 80 MG tablet; Take 1 tablet (80 mg) by mouth 2 times daily  Dispense: 60 tablet; Refill: 0    2. Benign essential hypertension  Chronic, stable.  Continue with amlodipine and lisinopril for now.  If persistent swelling of lower extremities could consider alternative to amlodipine due to possible side effects.  Chronic, stable.  Again discussed healthy lifestyle.    3. Class 2 severe obesity due to excess calories with serious comorbidity and body mass index (BMI) of 39.0 to 39.9 in adult (H)      BMI  Estimated body mass index is 39.2 kg/m  as calculated from the following:    Height as of this encounter: 1.778 m (5' 10\").    Weight as of this encounter: 123.9 kg (273 lb 3.2 oz).   Weight management plan: Discussed healthy diet and exercise guidelines          No follow-ups on file.    Fernando Garza is a 44 year old, presenting for the following health issues:  Follow Up (Leg Swelling " )    HPI   Here for follow-up on bilateral lower extremity edema.  Patient was seen for similar symptoms on 2/6.  Labs were stable at the time but kidney function was slightly low compared to baseline and sodium was low and mildly elevated.  He was started on furosemide 20 mg twice daily and subsequently increased to 40 mg twice daily.  Noted some slight improvement while he was on the medication but symptoms have since returned since being off of the furosemide.  He has been trying to focus on lifestyle management with diet, exercise, alcohol limitation.  Did get compression stockings but they do not go to the knee.  Noticing worsening of symptoms when he is on his feet all day for work.  No associated chest pain or pressure, palpitations, dizziness lightheadedness, syncope, headaches, vision changes, shortness of breath.    PAST MEDICAL HISTORY:   Past Medical History:   Diagnosis Date    Chronic sinusitis     No Comments Provided    Epididymitis     No Comments Provided    Personal history of other diseases of the female genital tract     Premature, born @ 23 weeks       PAST SURGICAL HISTORY:   Past Surgical History:   Procedure Laterality Date    ADENOIDECTOMY      x2    OTHER SURGICAL HISTORY      [id number],OTHER,x2    OTHER SURGICAL HISTORY      957404,OTHER    TONSILLECTOMY      No Comments Provided       FAMILY HISTORY:   Family History   Problem Relation Age of Onset    Hypertension Father         Hypertension    Other - See Comments Father         hypercholesterolemia/back problems    Other - See Comments Mother         MS    Diabetes Other         Diabetes,FH       SOCIAL HISTORY:   Social History     Tobacco Use    Smoking status: Never    Smokeless tobacco: Never   Substance Use Topics    Alcohol use: Yes     Alcohol/week: 14.0 standard drinks of alcohol     Types: 14 Cans of beer per week     Comment: 2 beers a day      No Known Allergies  Current Outpatient Medications   Medication    albuterol (PROAIR  "HFA/PROVENTIL HFA/VENTOLIN HFA) 108 (90 Base) MCG/ACT inhaler    amLODIPine (NORVASC) 10 MG tablet    benzonatate (TESSALON) 100 MG capsule    budesonide (PULMICORT) 0.5 MG/2ML neb solution    furosemide (LASIX) 80 MG tablet    lisinopril (ZESTRIL) 20 MG tablet    sildenafil (VIAGRA) 50 MG tablet     No current facility-administered medications for this visit.           Objective    /75 (BP Location: Right arm, Patient Position: Sitting, Cuff Size: Adult Regular)   Pulse 91   Temp 97.7  F (36.5  C) (Tympanic)   Resp 14   Ht 1.778 m (5' 10\")   Wt 123.9 kg (273 lb 3.2 oz)   SpO2 96%   BMI 39.20 kg/m    Body mass index is 39.2 kg/m .  Physical Exam   General: Pleasant, in no apparent distress.  Cardiovascular: Regular rate and rhythm with S1 equal to S2. No murmurs, friction rubs, or gallops.  2+ pitting edema bilaterally without significant overlying skin changes.  Respiratory: Lungs are resonant and clear to auscultation bilaterally. No wheezes, crackles, or rhonchi.  Psych: Appropriate mood and affect.    Results for orders placed or performed in visit on 03/22/24   Extra Tube     Status: None    Narrative    The following orders were created for panel order Extra Tube.  Procedure                               Abnormality         Status                     ---------                               -----------         ------                     Extra Green Top (Lithium...[[id number]]                      Final result               Extra Purple Top Tube[[id number]]                            Final result                 Please view results for these tests on the individual orders.   Extra Green Top (Lithium Heparin) Tube     Status: None   Result Value Ref Range    Hold Specimen JIC    Extra Purple Top Tube     Status: None   Result Value Ref Range    Hold Specimen JIC    Basic Metabolic Panel     Status: Abnormal   Result Value Ref Range    Sodium 135 135 - 145 mmol/L    Potassium 5.0 3.4 - 5.3 mmol/L    " Chloride 102 98 - 107 mmol/L    Carbon Dioxide (CO2) 23 22 - 29 mmol/L    Anion Gap 10 7 - 15 mmol/L    Urea Nitrogen 32.5 (H) 6.0 - 20.0 mg/dL    Creatinine 1.33 (H) 0.67 - 1.17 mg/dL    GFR Estimate 67 >60 mL/min/1.73m2    Calcium 9.2 8.6 - 10.0 mg/dL    Glucose 115 (H) 70 - 99 mg/dL       Signed Electronically by: Zuleika Holguin PA-C

## 2024-03-22 ENCOUNTER — OFFICE VISIT (OUTPATIENT)
Dept: FAMILY MEDICINE | Facility: OTHER | Age: 44
End: 2024-03-22
Attending: PHYSICIAN ASSISTANT
Payer: COMMERCIAL

## 2024-03-22 VITALS
HEART RATE: 91 BPM | TEMPERATURE: 97.7 F | BODY MASS INDEX: 39.11 KG/M2 | WEIGHT: 273.2 LBS | OXYGEN SATURATION: 96 % | HEIGHT: 70 IN | SYSTOLIC BLOOD PRESSURE: 119 MMHG | RESPIRATION RATE: 14 BRPM | DIASTOLIC BLOOD PRESSURE: 75 MMHG

## 2024-03-22 DIAGNOSIS — E66.01 CLASS 2 SEVERE OBESITY DUE TO EXCESS CALORIES WITH SERIOUS COMORBIDITY AND BODY MASS INDEX (BMI) OF 39.0 TO 39.9 IN ADULT (H): ICD-10-CM

## 2024-03-22 DIAGNOSIS — I10 BENIGN ESSENTIAL HYPERTENSION: ICD-10-CM

## 2024-03-22 DIAGNOSIS — R60.0 BILATERAL LOWER EXTREMITY EDEMA: Primary | ICD-10-CM

## 2024-03-22 DIAGNOSIS — E66.812 CLASS 2 SEVERE OBESITY DUE TO EXCESS CALORIES WITH SERIOUS COMORBIDITY AND BODY MASS INDEX (BMI) OF 39.0 TO 39.9 IN ADULT (H): ICD-10-CM

## 2024-03-22 LAB
ANION GAP SERPL CALCULATED.3IONS-SCNC: 10 MMOL/L (ref 7–15)
BUN SERPL-MCNC: 32.5 MG/DL (ref 6–20)
CALCIUM SERPL-MCNC: 9.2 MG/DL (ref 8.6–10)
CHLORIDE SERPL-SCNC: 102 MMOL/L (ref 98–107)
CREAT SERPL-MCNC: 1.33 MG/DL (ref 0.67–1.17)
DEPRECATED HCO3 PLAS-SCNC: 23 MMOL/L (ref 22–29)
EGFRCR SERPLBLD CKD-EPI 2021: 67 ML/MIN/1.73M2
GLUCOSE SERPL-MCNC: 115 MG/DL (ref 70–99)
HOLD SPECIMEN: NORMAL
HOLD SPECIMEN: NORMAL
POTASSIUM SERPL-SCNC: 5 MMOL/L (ref 3.4–5.3)
SODIUM SERPL-SCNC: 135 MMOL/L (ref 135–145)

## 2024-03-22 PROCEDURE — 99214 OFFICE O/P EST MOD 30 MIN: CPT | Performed by: PHYSICIAN ASSISTANT

## 2024-03-22 PROCEDURE — 36415 COLL VENOUS BLD VENIPUNCTURE: CPT | Mod: ZL | Performed by: PHYSICIAN ASSISTANT

## 2024-03-22 PROCEDURE — 80048 BASIC METABOLIC PNL TOTAL CA: CPT | Mod: ZL | Performed by: PHYSICIAN ASSISTANT

## 2024-03-22 RX ORDER — FUROSEMIDE 80 MG
80 TABLET ORAL 2 TIMES DAILY
Qty: 60 TABLET | Refills: 0 | Status: SHIPPED | OUTPATIENT
Start: 2024-03-22

## 2024-03-22 ASSESSMENT — ASTHMA QUESTIONNAIRES
QUESTION_2 LAST FOUR WEEKS HOW OFTEN HAVE YOU HAD SHORTNESS OF BREATH: NOT AT ALL
ACT_TOTALSCORE: 25
QUESTION_4 LAST FOUR WEEKS HOW OFTEN HAVE YOU USED YOUR RESCUE INHALER OR NEBULIZER MEDICATION (SUCH AS ALBUTEROL): NOT AT ALL
QUESTION_3 LAST FOUR WEEKS HOW OFTEN DID YOUR ASTHMA SYMPTOMS (WHEEZING, COUGHING, SHORTNESS OF BREATH, CHEST TIGHTNESS OR PAIN) WAKE YOU UP AT NIGHT OR EARLIER THAN USUAL IN THE MORNING: NOT AT ALL
QUESTION_5 LAST FOUR WEEKS HOW WOULD YOU RATE YOUR ASTHMA CONTROL: COMPLETELY CONTROLLED
ACUTE_EXACERBATION_TODAY: NO
ACT_TOTALSCORE: 25
QUESTION_1 LAST FOUR WEEKS HOW MUCH OF THE TIME DID YOUR ASTHMA KEEP YOU FROM GETTING AS MUCH DONE AT WORK, SCHOOL OR AT HOME: NONE OF THE TIME

## 2024-03-22 ASSESSMENT — PAIN SCALES - GENERAL: PAINLEVEL: MILD PAIN (3)

## 2024-03-22 ASSESSMENT — PATIENT HEALTH QUESTIONNAIRE - PHQ9
SUM OF ALL RESPONSES TO PHQ QUESTIONS 1-9: 0
SUM OF ALL RESPONSES TO PHQ QUESTIONS 1-9: 0
10. IF YOU CHECKED OFF ANY PROBLEMS, HOW DIFFICULT HAVE THESE PROBLEMS MADE IT FOR YOU TO DO YOUR WORK, TAKE CARE OF THINGS AT HOME, OR GET ALONG WITH OTHER PEOPLE: NOT DIFFICULT AT ALL

## 2024-03-22 NOTE — NURSING NOTE
"Chief Complaint   Patient presents with    Follow Up     Leg Swelling        Initial /75 (BP Location: Right arm, Patient Position: Sitting, Cuff Size: Adult Regular)   Pulse 91   Temp 97.7  F (36.5  C) (Tympanic)   Resp 14   Ht 1.778 m (5' 10\")   Wt 123.9 kg (273 lb 3.2 oz)   SpO2 96%   BMI 39.20 kg/m   Estimated body mass index is 39.2 kg/m  as calculated from the following:    Height as of this encounter: 1.778 m (5' 10\").    Weight as of this encounter: 123.9 kg (273 lb 3.2 oz).  Medication Review: complete    The next two questions are to help us understand your food security.  If you are feeling you need any assistance in this area, we have resources available to support you today.          2/6/2024   SDOH- Food Insecurity   Within the past 12 months, did you worry that your food would run out before you got money to buy more? N   Within the past 12 months, did the food you bought just not last and you didn t have money to get more? N         Health Care Directive:  Patient does not have a Health Care Directive or Living Will: Discussed advance care planning with patient; information given to patient to review.    Tabitha Joseph      "

## 2024-04-03 DIAGNOSIS — G47.33 OBSTRUCTIVE SLEEP APNEA (ADULT) (PEDIATRIC): Primary | ICD-10-CM

## 2024-07-13 ENCOUNTER — HEALTH MAINTENANCE LETTER (OUTPATIENT)
Age: 44
End: 2024-07-13

## 2024-12-23 DIAGNOSIS — R60.0 BILATERAL LOWER EXTREMITY EDEMA: ICD-10-CM

## 2024-12-24 RX ORDER — FUROSEMIDE 80 MG/1
80 TABLET ORAL 2 TIMES DAILY
Qty: 60 TABLET | Refills: 0 | Status: SHIPPED | OUTPATIENT
Start: 2024-12-24

## 2024-12-24 NOTE — TELEPHONE ENCOUNTER
[facility] sent Rx request for the following:      Requested Prescriptions   Pending Prescriptions Disp Refills    furosemide (LASIX) 80 MG tablet [Pharmacy Med Name: Furosemide 80 MG Oral Tablet] 60 tablet 0     Sig: Take 1 tablet by mouth twice daily      Last Prescription Date:   03/22/2024  Last Fill Qty/Refills:         60, R-0    Last Office Visit:              03/22/2024   Future Office visit:           None    Prescription approved per [facility] Refill Protocol.   Maddie Davidson RN on 12/24/2024 at 9:34 AM

## 2025-04-17 DIAGNOSIS — G47.33 OSA (OBSTRUCTIVE SLEEP APNEA): Primary | ICD-10-CM

## 2025-07-19 ENCOUNTER — HEALTH MAINTENANCE LETTER (OUTPATIENT)
Age: 45
End: 2025-07-19

## 2025-08-25 ENCOUNTER — MYC REFILL (OUTPATIENT)
Dept: FAMILY MEDICINE | Facility: OTHER | Age: 45
End: 2025-08-25
Payer: COMMERCIAL

## 2025-08-25 DIAGNOSIS — N52.9 ERECTILE DYSFUNCTION, UNSPECIFIED ERECTILE DYSFUNCTION TYPE: ICD-10-CM

## 2025-08-25 DIAGNOSIS — I10 BENIGN ESSENTIAL HYPERTENSION: ICD-10-CM

## 2025-08-26 DIAGNOSIS — I10 BENIGN ESSENTIAL HYPERTENSION: ICD-10-CM

## 2025-08-26 DIAGNOSIS — N52.9 ERECTILE DYSFUNCTION, UNSPECIFIED ERECTILE DYSFUNCTION TYPE: ICD-10-CM

## 2025-08-27 RX ORDER — SILDENAFIL 50 MG/1
TABLET, FILM COATED ORAL
Qty: 30 TABLET | Refills: 0 | OUTPATIENT
Start: 2025-08-27

## 2025-08-27 RX ORDER — SILDENAFIL 50 MG/1
50 TABLET, FILM COATED ORAL DAILY PRN
Qty: 30 TABLET | Refills: 0 | Status: SHIPPED | OUTPATIENT
Start: 2025-08-27

## (undated) RX ORDER — METHYLPREDNISOLONE ACETATE 80 MG/ML
INJECTION, SUSPENSION INTRA-ARTICULAR; INTRALESIONAL; INTRAMUSCULAR; SOFT TISSUE
Status: DISPENSED
Start: 2022-01-23

## (undated) RX ORDER — METHYLPREDNISOLONE SODIUM SUCCINATE 40 MG/ML
INJECTION, POWDER, LYOPHILIZED, FOR SOLUTION INTRAMUSCULAR; INTRAVENOUS
Status: DISPENSED
Start: 2022-01-23

## (undated) RX ORDER — LIDOCAINE HYDROCHLORIDE 10 MG/ML
INJECTION, SOLUTION EPIDURAL; INFILTRATION; INTRACAUDAL; PERINEURAL
Status: DISPENSED
Start: 2022-01-23